# Patient Record
Sex: FEMALE | Race: WHITE | NOT HISPANIC OR LATINO | Employment: FULL TIME | URBAN - METROPOLITAN AREA
[De-identification: names, ages, dates, MRNs, and addresses within clinical notes are randomized per-mention and may not be internally consistent; named-entity substitution may affect disease eponyms.]

---

## 2018-04-02 ENCOUNTER — OFFICE VISIT CONVERTED (OUTPATIENT)
Dept: FAMILY MEDICINE CLINIC | Facility: CLINIC | Age: 28
End: 2018-04-02
Attending: NURSE PRACTITIONER

## 2018-09-21 ENCOUNTER — OFFICE VISIT CONVERTED (OUTPATIENT)
Dept: FAMILY MEDICINE CLINIC | Facility: CLINIC | Age: 28
End: 2018-09-21
Attending: NURSE PRACTITIONER

## 2018-09-28 ENCOUNTER — OFFICE VISIT CONVERTED (OUTPATIENT)
Dept: FAMILY MEDICINE CLINIC | Facility: CLINIC | Age: 28
End: 2018-09-28
Attending: NURSE PRACTITIONER

## 2018-09-28 ENCOUNTER — CONVERSION ENCOUNTER (OUTPATIENT)
Dept: FAMILY MEDICINE CLINIC | Facility: CLINIC | Age: 28
End: 2018-09-28

## 2018-10-09 ENCOUNTER — OFFICE VISIT CONVERTED (OUTPATIENT)
Dept: ONCOLOGY | Facility: HOSPITAL | Age: 28
End: 2018-10-09
Attending: INTERNAL MEDICINE

## 2018-10-19 ENCOUNTER — OFFICE VISIT CONVERTED (OUTPATIENT)
Dept: FAMILY MEDICINE CLINIC | Facility: CLINIC | Age: 28
End: 2018-10-19
Attending: NURSE PRACTITIONER

## 2018-10-23 ENCOUNTER — OFFICE VISIT CONVERTED (OUTPATIENT)
Dept: ONCOLOGY | Facility: HOSPITAL | Age: 28
End: 2018-10-23
Attending: INTERNAL MEDICINE

## 2018-12-18 ENCOUNTER — OFFICE VISIT CONVERTED (OUTPATIENT)
Dept: FAMILY MEDICINE CLINIC | Facility: CLINIC | Age: 28
End: 2018-12-18
Attending: NURSE PRACTITIONER

## 2018-12-18 ENCOUNTER — CONVERSION ENCOUNTER (OUTPATIENT)
Dept: FAMILY MEDICINE CLINIC | Facility: CLINIC | Age: 28
End: 2018-12-18

## 2019-01-07 ENCOUNTER — HOSPITAL ENCOUNTER (OUTPATIENT)
Dept: CT IMAGING | Facility: HOSPITAL | Age: 29
Discharge: HOME | End: 2019-01-07
Attending: INTERNAL MEDICINE

## 2019-01-24 ENCOUNTER — OFFICE VISIT CONVERTED (OUTPATIENT)
Dept: ONCOLOGY | Facility: HOSPITAL | Age: 29
End: 2019-01-24
Attending: INTERNAL MEDICINE

## 2019-02-15 ENCOUNTER — OFFICE VISIT CONVERTED (OUTPATIENT)
Dept: FAMILY MEDICINE CLINIC | Facility: CLINIC | Age: 29
End: 2019-02-15
Attending: NURSE PRACTITIONER

## 2019-03-14 ENCOUNTER — OFFICE VISIT CONVERTED (OUTPATIENT)
Dept: FAMILY MEDICINE CLINIC | Facility: CLINIC | Age: 29
End: 2019-03-14
Attending: NURSE PRACTITIONER

## 2019-04-18 ENCOUNTER — HOSPITAL ENCOUNTER (OUTPATIENT)
Dept: CARDIOLOGY | Facility: HOSPITAL | Age: 29
Discharge: HOME | End: 2019-04-18
Attending: NURSE PRACTITIONER

## 2019-04-18 ENCOUNTER — OFFICE VISIT CONVERTED (OUTPATIENT)
Dept: FAMILY MEDICINE CLINIC | Facility: CLINIC | Age: 29
End: 2019-04-18
Attending: NURSE PRACTITIONER

## 2019-04-18 ENCOUNTER — HOSPITAL ENCOUNTER (OUTPATIENT)
Dept: GENERAL RADIOLOGY | Facility: HOSPITAL | Age: 29
Discharge: HOME | End: 2019-04-18
Attending: NURSE PRACTITIONER

## 2019-04-24 ENCOUNTER — OFFICE VISIT CONVERTED (OUTPATIENT)
Dept: ONCOLOGY | Facility: HOSPITAL | Age: 29
End: 2019-04-24
Attending: INTERNAL MEDICINE

## 2019-04-24 ENCOUNTER — HOSPITAL ENCOUNTER (OUTPATIENT)
Dept: ONCOLOGY | Facility: HOSPITAL | Age: 29
Discharge: HOME | End: 2019-04-24
Attending: INTERNAL MEDICINE

## 2019-05-16 ENCOUNTER — HOSPITAL ENCOUNTER (OUTPATIENT)
Dept: GENERAL RADIOLOGY | Facility: HOSPITAL | Age: 29
Discharge: HOME | End: 2019-05-16
Attending: OBSTETRICS & GYNECOLOGY

## 2019-05-22 ENCOUNTER — HOSPITAL ENCOUNTER (OUTPATIENT)
Dept: FAMILY MEDICINE CLINIC | Facility: CLINIC | Age: 29
Discharge: HOME | End: 2019-05-22
Attending: NURSE PRACTITIONER

## 2019-05-22 ENCOUNTER — HOSPITAL ENCOUNTER (OUTPATIENT)
Dept: CT IMAGING | Facility: HOSPITAL | Age: 29
Discharge: HOME | End: 2019-05-22
Attending: NURSE PRACTITIONER

## 2019-05-22 ENCOUNTER — OFFICE VISIT CONVERTED (OUTPATIENT)
Dept: FAMILY MEDICINE CLINIC | Facility: CLINIC | Age: 29
End: 2019-05-22
Attending: NURSE PRACTITIONER

## 2019-05-22 ENCOUNTER — CONVERSION ENCOUNTER (OUTPATIENT)
Dept: FAMILY MEDICINE CLINIC | Facility: CLINIC | Age: 29
End: 2019-05-22

## 2019-05-24 LAB — BACTERIA UR CULT: NORMAL

## 2019-05-28 ENCOUNTER — HOSPITAL ENCOUNTER (OUTPATIENT)
Dept: OTHER | Facility: HOSPITAL | Age: 29
Discharge: HOME | End: 2019-05-28
Attending: OBSTETRICS & GYNECOLOGY

## 2019-05-29 ENCOUNTER — HOSPITAL ENCOUNTER (OUTPATIENT)
Dept: LAB | Facility: HOSPITAL | Age: 29
Discharge: HOME | End: 2019-05-29
Attending: OBSTETRICS & GYNECOLOGY

## 2019-05-29 LAB
ALBUMIN SERPL-MCNC: 4.6 G/DL (ref 3.5–5)
ALBUMIN/GLOB SERPL: 1.8 {RATIO} (ref 1.4–2.6)
ALP SERPL-CCNC: 62 U/L (ref 42–98)
ALT SERPL-CCNC: 11 U/L (ref 10–40)
ANION GAP SERPL CALC-SCNC: 15 MMOL/L (ref 8–19)
AST SERPL-CCNC: 14 U/L (ref 15–50)
BASOPHILS # BLD AUTO: 0.02 10*3/UL (ref 0–0.2)
BASOPHILS NFR BLD AUTO: 0.3 % (ref 0–3)
BILIRUB SERPL-MCNC: 0.36 MG/DL (ref 0.2–1.3)
BUN SERPL-MCNC: 13 MG/DL (ref 5–25)
BUN/CREAT SERPL: 17 {RATIO} (ref 6–20)
CALCIUM SERPL-MCNC: 9.5 MG/DL (ref 8.7–10.4)
CHLORIDE SERPL-SCNC: 102 MMOL/L (ref 99–111)
CONV ABS IMM GRAN: 0.02 10*3/UL (ref 0–0.2)
CONV CO2: 28 MMOL/L (ref 22–32)
CONV IMMATURE GRAN: 0.3 % (ref 0–1.8)
CONV TOTAL PROTEIN: 7.1 G/DL (ref 6.3–8.2)
CREAT UR-MCNC: 0.78 MG/DL (ref 0.5–0.9)
DEPRECATED RDW RBC AUTO: 41.7 FL (ref 36.4–46.3)
EOSINOPHIL # BLD AUTO: 0.07 10*3/UL (ref 0–0.7)
EOSINOPHIL # BLD AUTO: 1 % (ref 0–7)
ERYTHROCYTE [DISTWIDTH] IN BLOOD BY AUTOMATED COUNT: 12.1 % (ref 11.7–14.4)
FSH SERPL-ACNC: 8.7 M[IU]/ML
GFR SERPLBLD BASED ON 1.73 SQ M-ARVRAT: >60 ML/MIN/{1.73_M2}
GLOBULIN UR ELPH-MCNC: 2.5 G/DL (ref 2–3.5)
GLUCOSE SERPL-MCNC: 84 MG/DL (ref 65–99)
HBA1C MFR BLD: 14.5 G/DL (ref 12–16)
HCG INTACT+B SERPL-ACNC: <0.5 M[IU]/ML (ref 0–5)
HCT VFR BLD AUTO: 45.2 % (ref 37–47)
LH SERPL-ACNC: 18.5 M[IU]/ML
LYMPHOCYTES # BLD AUTO: 1.84 10*3/UL (ref 1–5)
MCH RBC QN AUTO: 30 PG (ref 27–31)
MCHC RBC AUTO-ENTMCNC: 32.1 G/DL (ref 33–37)
MCV RBC AUTO: 93.4 FL (ref 81–99)
MONOCYTES # BLD AUTO: 0.47 10*3/UL (ref 0.2–1.2)
MONOCYTES NFR BLD AUTO: 7 % (ref 3–10)
NEUTROPHILS # BLD AUTO: 4.26 10*3/UL (ref 2–8)
NEUTROPHILS NFR BLD AUTO: 63.9 % (ref 30–85)
NRBC CBCN: 0 % (ref 0–0.7)
OSMOLALITY SERPL CALC.SUM OF ELEC: 291 MOSM/KG (ref 273–304)
PLATELET # BLD AUTO: 315 10*3/UL (ref 130–400)
PMV BLD AUTO: 10.5 FL (ref 9.4–12.3)
POTASSIUM SERPL-SCNC: 4.1 MMOL/L (ref 3.5–5.3)
PROLACTIN SERPL-MCNC: 7.15 NG/ML
RBC # BLD AUTO: 4.84 10*6/UL (ref 4.2–5.4)
SODIUM SERPL-SCNC: 141 MMOL/L (ref 135–147)
TSH SERPL-ACNC: 0.94 M[IU]/L (ref 0.27–4.2)
VARIANT LYMPHS NFR BLD MANUAL: 27.5 % (ref 20–45)
WBC # BLD AUTO: 6.68 10*3/UL (ref 4.8–10.8)

## 2019-05-30 LAB — CONV THYROXINE TOTAL: 8.1 UG/DL (ref 4.5–12)

## 2019-08-29 ENCOUNTER — OFFICE VISIT CONVERTED (OUTPATIENT)
Dept: FAMILY MEDICINE CLINIC | Facility: CLINIC | Age: 29
End: 2019-08-29
Attending: NURSE PRACTITIONER

## 2019-08-29 ENCOUNTER — HOSPITAL ENCOUNTER (OUTPATIENT)
Dept: CT IMAGING | Facility: HOSPITAL | Age: 29
Discharge: HOME | End: 2019-08-29
Attending: NURSE PRACTITIONER

## 2019-12-13 ENCOUNTER — OFFICE VISIT CONVERTED (OUTPATIENT)
Dept: FAMILY MEDICINE CLINIC | Facility: CLINIC | Age: 29
End: 2019-12-13
Attending: NURSE PRACTITIONER

## 2020-02-03 ENCOUNTER — HOSPITAL ENCOUNTER (OUTPATIENT)
Dept: FAMILY MEDICINE CLINIC | Facility: CLINIC | Age: 30
Discharge: HOME | End: 2020-02-03
Attending: NURSE PRACTITIONER

## 2020-02-03 ENCOUNTER — OFFICE VISIT CONVERTED (OUTPATIENT)
Dept: FAMILY MEDICINE CLINIC | Facility: CLINIC | Age: 30
End: 2020-02-03
Attending: NURSE PRACTITIONER

## 2020-02-05 LAB
BACTERIA SPEC AEROBE CULT: NORMAL
BACTERIA UR CULT: NORMAL

## 2020-03-16 ENCOUNTER — OFFICE VISIT CONVERTED (OUTPATIENT)
Dept: FAMILY MEDICINE CLINIC | Facility: CLINIC | Age: 30
End: 2020-03-16
Attending: NURSE PRACTITIONER

## 2020-03-16 ENCOUNTER — HOSPITAL ENCOUNTER (OUTPATIENT)
Dept: GENERAL RADIOLOGY | Facility: HOSPITAL | Age: 30
Discharge: HOME | End: 2020-03-16
Attending: NURSE PRACTITIONER

## 2020-07-09 ENCOUNTER — HOSPITAL ENCOUNTER (OUTPATIENT)
Dept: OTHER | Facility: HOSPITAL | Age: 30
Discharge: HOME | End: 2020-07-09
Attending: INTERNAL MEDICINE

## 2020-07-09 LAB
T4 FREE SERPL-MCNC: 1.7 NG/DL (ref 0.9–1.8)
TSH SERPL-ACNC: 0.51 M[IU]/L (ref 0.27–4.2)

## 2020-07-10 LAB — T3FREE SERPL-MCNC: 3.6 PG/ML (ref 2–4.4)

## 2020-07-30 ENCOUNTER — HOSPITAL ENCOUNTER (OUTPATIENT)
Dept: LAB | Facility: HOSPITAL | Age: 30
Discharge: HOME | End: 2020-07-30
Attending: NURSE PRACTITIONER

## 2020-07-30 ENCOUNTER — OFFICE VISIT CONVERTED (OUTPATIENT)
Dept: FAMILY MEDICINE CLINIC | Facility: CLINIC | Age: 30
End: 2020-07-30
Attending: NURSE PRACTITIONER

## 2020-07-30 ENCOUNTER — CONVERSION ENCOUNTER (OUTPATIENT)
Dept: FAMILY MEDICINE CLINIC | Facility: CLINIC | Age: 30
End: 2020-07-30

## 2020-07-30 LAB
FOLATE SERPL-MCNC: 15.3 NG/ML (ref 4.8–20)
VIT B12 SERPL-MCNC: 301 PG/ML (ref 211–911)

## 2020-07-31 LAB
25(OH)D3 SERPL-MCNC: 37.5 NG/ML (ref 30–100)
IRON SATN MFR SERPL: 27 % (ref 20–55)
IRON SERPL-MCNC: 106 UG/DL (ref 60–170)
TIBC SERPL-MCNC: 396 UG/DL (ref 245–450)
TRANSFERRIN SERPL-MCNC: 277 MG/DL (ref 250–380)

## 2020-08-12 ENCOUNTER — CONVERSION ENCOUNTER (OUTPATIENT)
Dept: FAMILY MEDICINE CLINIC | Facility: CLINIC | Age: 30
End: 2020-08-12

## 2020-08-12 ENCOUNTER — OFFICE VISIT CONVERTED (OUTPATIENT)
Dept: FAMILY MEDICINE CLINIC | Facility: CLINIC | Age: 30
End: 2020-08-12
Attending: NURSE PRACTITIONER

## 2021-01-05 ENCOUNTER — HOSPITAL ENCOUNTER (OUTPATIENT)
Dept: GENERAL RADIOLOGY | Facility: HOSPITAL | Age: 31
Discharge: HOME | End: 2021-01-05
Attending: NURSE PRACTITIONER

## 2021-05-13 NOTE — PROGRESS NOTES
Progress Note      Patient Name: Kelin Streeter   Patient ID: [id number]   Sex: Female   YOB: 1990    Primary Care Provider: Odette NOWAK   Referring Provider: Odette NOWAK    Visit Date: August 12, 2020    Provider: SHE Vargas   Location: [facility]   Location Address: [address] [place] [address], [address]  REESE Lopez  [id number]   Location Phone: [phone]          Chief Complaint  · Annual Exam  · (Health Maintainence Information Reviewed Under Results)      History Of Present Illness  Last PAP Smear: 06/01/2020.   Date of Last Mammogram: NEVER   Date of Last Colonoscopy: NEVER   No current complaints.   Kelin Streeter is a 30 year old /White female who presents for evaluation and treatment of:      Patient is here today for a physical exam. Says she has no c/c.    anxiety-reports anxiety under better control w/prestiq.    hypothyroid-she has been on Synthroid x 2 yrs-denies any concerns w/thyroid.     hyperlipidemia-she ha been on crestor x 2 yrs -states she has not been tested in 2 yrs.    RAHAT-well controlled w/Zyrtec-admits she takes in the fall and spring.    last pap June -[facility].       Past Medical History  Disease Name Date Onset Notes   DVT (deep venous thrombosis) --  --    Hyperlipemia --  --    Hypothyroid --  --    Pap smear for cervical cancer screening 3/2019 --    Pulmonary emboli --  --          Past Surgical History  Procedure Name Date Notes   Cesarian Section 2012 --    Cholecystectomy 2007 --    Cyst Removal 2009 SINUS   IUD Removal 2012 --          Medication List  Name Date Started Instructions   Pristiq 25 mg oral tablet extended release 24 hr 07/30/2020 take 1 tablet by oral route daily for 90 days   rosuvastatin 5 mg oral tablet 12/02/2019 TAKE 1 TABLET BY MOUTH EVERYDAY AT BEDTIME   Synthroid 50 mcg oral tablet  take 1 tablet (50 mcg) by oral route once daily   Zyrtec 10 mg oral tablet  take 1 tablet (10 mg) by oral route once  daily         Allergy List  Allergen Name Date Reaction Notes   NO KNOWN DRUG ALLERGIES --  --  --        Allergies Reconciled  Family Medical History  Disease Name Relative/Age Notes   Breast Neoplasm, Malignant Grandmother (paternal)/   --    Lung Neoplasm, Malignant Grandmother (paternal)/   --    Cervical Neoplasm, Malignant Mother/   --    Skin Carcinoma In Situ Mother/   --    Hyperlipidemia Father/   --    Prostate cancer Uncle/   Paternal Uncle   -Mother's Family History Unknown  pt states mother was adopted         Reproductive History  Menstrual   Age Menarche: 11   Pregnancy Summary   Total Pregnancies: 2 Full Term: 1 Premature: 0   Ab Induced: 0 Ab Spontaneous: 0 Ectopics: 0   Multiples: 0 Livin         Social History  Finding Status Start/Stop Quantity Notes   Active but no formal exercise --  --/-- --  --    Alcohol Current some day --/-- occ 2020 -     --  --/-- --  --    No known infection risk --  --/-- --  --    Tobacco Never --/-- --  12/15/2017 - never          Immunizations  NameDate Admin Mfg Trade Name Lot Number Route Inj VIS Given VIS Publication   [id number]/2018 SKB Fluzone Quadrivalent  NE NE 2019    Comments:    Drvyimfni27/01/2015 NE Not Entered  NE NE 2016    Comments: states recieved in 2015 per patient.   Wcbychppo07/2014 NE Not Entered 2A2KX NE NE 2015   Comments: per pt   Wdjabfwat39/2011 SKB Fluzone Quadrivalent  NE NE 2019    Comments: outside facility   Tdap2011 SKB BOOSTRIX  NE NE 2019    Comments:          Review of Systems  · Constitutional  o Denies  o : fatigue, night sweats  · Eyes  o Denies  o : double vision, blurred vision  · HENT  o Denies  o : vertigo, recent head injury  · Breasts  o Denies  o : abnormal changes in breast size, additional breast symptoms except as noted in the HPI  · Cardiovascular  o Denies  o : chest pain, irregular heart beats  · Respiratory  o Denies  o : shortness of  "breath, productive cough  · Gastrointestinal  o Denies  o : nausea, vomiting  · Genitourinary  o Denies  o : dysuria, urinary retention  · Integument  o Denies  o : hair growth change, new skin lesions  · Neurologic  o Denies  o : altered mental status, seizures  · Musculoskeletal  o Denies  o : joint swelling, limitation of motion  · Endocrine  o Denies  o : cold intolerance, heat intolerance  · Psychiatric  o Admits  o : anxiety  o Denies  o : suicidal ideation, homicidal ideation  · Heme-Lymph  o Denies  o : petechiae, lymph node enlargement or tenderness  · Allergic-Immunologic  o Admits  o : seasonal allergies  o Denies  o : frequent illnesses      Vitals  Date Time BP Position Site L\R Cuff Size HR RR TEMP (F) WT  HT  BMI kg/m2 BSA m2 O2 Sat HC       08/12/2020 10:02 /60 Sitting    68 - R   139lbs 6oz 5'  3\" 24.69 1.68 99 %          Physical Examination  · Constitutional  o Appearance  o : well-nourished, in no acute distress  · Neck  o Inspection/Palpation  o : normal appearance, no masses or tenderness, trachea midline  o Thyroid  o : gland size normal, nontender, no nodules or masses present on palpation  · Respiratory  o Respiratory Effort  o : breathing unlabored  o Inspection of Chest  o : normal appearance  o Auscultation of Lungs  o : normal breath sounds throughout  · Cardiovascular  o Heart  o :   § Auscultation of Heart  § : regular rate and rhythm, no murmurs, gallops or rubs  · Neurologic  o Mental Status Examination  o :   § Orientation  § : grossly oriented to person, place and time  o Gait and Station  o : normal gait, able to stand without difficulty  · Psychiatric  o Judgement and Insight  o : judgment and insight intact  o Mood and Affect  o : mood normal, affect appropriate          Assessment  · Anxiety disorder     300.00/F41.9  anxiety under better control since starting Pristiq.  · Hyperlipidemia     272.4/E78.5  doing well on crestor-reports last lipid 2 yrs ago-ordered " today.  · Hypothyroidism     244.9/E03.9  doing well on current dose of Synthroid-she is seeing  (endocrinology) he is managing her thyroid.  · Routine lab draw     V72.60/Z01.89  · Seasonal allergies     477.9/J30.2  admits seasonal allergies takes Zyrtec prn symptoms.      Plan  · Orders  o CBC with Auto Diff [facility] ([id number]) - V72.60/Z01.89 - 08/12/2020  o CMP [facility] ([id number]) - V72.60/Z01.89 - 08/12/2020  o Lipid Panel [facility] ([id number]) - 272.4/E78.5 - 08/12/2020  o ACO-39: Current medications updated and reviewed () - - 08/12/2020  o ACO-14: Influenza immunization was not administered for reasons documented () - - 08/12/2020  · Medications  o Medications have been Reconciled  o Transition of Care or Provider Policy  · Instructions  o Advised that cheeses and other sources of dairy fats, animal fats, fast food, and the extras (candy, pastries, pies, doughnuts and cookies) all contain LDL raising nutrients. Advised to increase fruits, vegetables, whole grains, and to monitor portion sizes.   o Counseled on monthly breast self exams.   o Counseled on STD prevention.  o Counseled on diet and exercise.   o Counseled on weight-bearing exercise.  o Recommended Calcium with Vitamin D twice daily.  o Take all medications as prescribed/directed.  o Patient was educated/instructed on their diagnosis, treatment and medications prior to discharge from the clinic today.  o Patient instructed to seek medical attention urgently for new or worsening symptoms.  o Call the office with any concerns or questions.  o Discussed Covid-19 precautions including, but not limited to, social distancing, avoid touching your face, and hand washing.   o 3 month follow up  · Disposition  o Call or Return if symptoms worsen or persist.            Electronically Signed by: SHE Vargas -Author on August 12, 2020 10:27:00 AM

## 2021-05-15 VITALS
OXYGEN SATURATION: 100 % | DIASTOLIC BLOOD PRESSURE: 80 MMHG | HEIGHT: 63 IN | SYSTOLIC BLOOD PRESSURE: 120 MMHG | BODY MASS INDEX: 23.74 KG/M2 | WEIGHT: 134 LBS | HEART RATE: 76 BPM

## 2021-05-15 VITALS
OXYGEN SATURATION: 99 % | SYSTOLIC BLOOD PRESSURE: 118 MMHG | HEART RATE: 77 BPM | WEIGHT: 134.25 LBS | HEIGHT: 63 IN | DIASTOLIC BLOOD PRESSURE: 80 MMHG | BODY MASS INDEX: 23.79 KG/M2

## 2021-05-15 VITALS
DIASTOLIC BLOOD PRESSURE: 68 MMHG | BODY MASS INDEX: 24.98 KG/M2 | SYSTOLIC BLOOD PRESSURE: 122 MMHG | WEIGHT: 141 LBS | HEART RATE: 72 BPM | HEIGHT: 63 IN

## 2021-05-15 VITALS
BODY MASS INDEX: 24.7 KG/M2 | OXYGEN SATURATION: 99 % | HEIGHT: 63 IN | HEART RATE: 68 BPM | SYSTOLIC BLOOD PRESSURE: 112 MMHG | DIASTOLIC BLOOD PRESSURE: 60 MMHG | WEIGHT: 139.37 LBS

## 2021-05-15 VITALS
DIASTOLIC BLOOD PRESSURE: 80 MMHG | WEIGHT: 142.25 LBS | BODY MASS INDEX: 25.2 KG/M2 | HEART RATE: 67 BPM | SYSTOLIC BLOOD PRESSURE: 110 MMHG | HEIGHT: 63 IN | OXYGEN SATURATION: 99 % | TEMPERATURE: 97 F

## 2021-05-15 VITALS
HEART RATE: 114 BPM | OXYGEN SATURATION: 97 % | BODY MASS INDEX: 25.18 KG/M2 | DIASTOLIC BLOOD PRESSURE: 80 MMHG | TEMPERATURE: 98.7 F | HEIGHT: 63 IN | SYSTOLIC BLOOD PRESSURE: 110 MMHG | WEIGHT: 142.12 LBS

## 2021-05-15 VITALS
HEIGHT: 63 IN | DIASTOLIC BLOOD PRESSURE: 100 MMHG | WEIGHT: 125.37 LBS | TEMPERATURE: 98.9 F | OXYGEN SATURATION: 100 % | BODY MASS INDEX: 22.21 KG/M2 | SYSTOLIC BLOOD PRESSURE: 134 MMHG | HEART RATE: 104 BPM

## 2021-05-15 VITALS
BODY MASS INDEX: 24.5 KG/M2 | HEIGHT: 63 IN | HEART RATE: 88 BPM | DIASTOLIC BLOOD PRESSURE: 95 MMHG | WEIGHT: 138.25 LBS | SYSTOLIC BLOOD PRESSURE: 133 MMHG | OXYGEN SATURATION: 100 %

## 2021-05-15 VITALS
TEMPERATURE: 98.4 F | DIASTOLIC BLOOD PRESSURE: 96 MMHG | OXYGEN SATURATION: 100 % | HEIGHT: 67 IN | HEART RATE: 76 BPM | BODY MASS INDEX: 21.66 KG/M2 | SYSTOLIC BLOOD PRESSURE: 140 MMHG | WEIGHT: 138 LBS

## 2021-05-15 VITALS
HEIGHT: 63 IN | BODY MASS INDEX: 24.27 KG/M2 | DIASTOLIC BLOOD PRESSURE: 84 MMHG | SYSTOLIC BLOOD PRESSURE: 129 MMHG | WEIGHT: 137 LBS | OXYGEN SATURATION: 100 % | HEART RATE: 65 BPM

## 2021-05-15 VITALS
WEIGHT: 134.37 LBS | HEART RATE: 82 BPM | SYSTOLIC BLOOD PRESSURE: 134 MMHG | BODY MASS INDEX: 23.81 KG/M2 | HEIGHT: 63 IN | DIASTOLIC BLOOD PRESSURE: 85 MMHG

## 2021-05-16 VITALS
TEMPERATURE: 97.1 F | DIASTOLIC BLOOD PRESSURE: 86 MMHG | WEIGHT: 147.12 LBS | HEIGHT: 63 IN | SYSTOLIC BLOOD PRESSURE: 123 MMHG | HEART RATE: 103 BPM | BODY MASS INDEX: 26.07 KG/M2

## 2021-05-16 VITALS
HEART RATE: 88 BPM | DIASTOLIC BLOOD PRESSURE: 80 MMHG | HEIGHT: 63 IN | SYSTOLIC BLOOD PRESSURE: 128 MMHG | TEMPERATURE: 97 F | WEIGHT: 138 LBS | BODY MASS INDEX: 24.45 KG/M2

## 2021-05-16 VITALS
HEIGHT: 63 IN | WEIGHT: 136 LBS | SYSTOLIC BLOOD PRESSURE: 135 MMHG | BODY MASS INDEX: 24.1 KG/M2 | HEART RATE: 70 BPM | DIASTOLIC BLOOD PRESSURE: 84 MMHG

## 2021-05-16 VITALS
HEART RATE: 98 BPM | DIASTOLIC BLOOD PRESSURE: 80 MMHG | HEIGHT: 63 IN | SYSTOLIC BLOOD PRESSURE: 115 MMHG | WEIGHT: 138.25 LBS | BODY MASS INDEX: 24.5 KG/M2 | TEMPERATURE: 96.7 F

## 2021-05-28 VITALS
RESPIRATION RATE: 16 BRPM | WEIGHT: 135.8 LBS | OXYGEN SATURATION: 100 % | DIASTOLIC BLOOD PRESSURE: 97 MMHG | HEART RATE: 78 BPM | OXYGEN SATURATION: 100 % | BODY MASS INDEX: 24.06 KG/M2 | TEMPERATURE: 98.4 F | DIASTOLIC BLOOD PRESSURE: 81 MMHG | TEMPERATURE: 97.5 F | HEIGHT: 63 IN | HEIGHT: 63 IN | RESPIRATION RATE: 16 BRPM | HEART RATE: 60 BPM | SYSTOLIC BLOOD PRESSURE: 113 MMHG | SYSTOLIC BLOOD PRESSURE: 124 MMHG | BODY MASS INDEX: 24.02 KG/M2 | WEIGHT: 135.58 LBS

## 2021-05-28 VITALS
BODY MASS INDEX: 24.53 KG/M2 | OXYGEN SATURATION: 99 % | BODY MASS INDEX: 23.98 KG/M2 | HEART RATE: 80 BPM | OXYGEN SATURATION: 98 % | TEMPERATURE: 98.2 F | HEART RATE: 74 BPM | SYSTOLIC BLOOD PRESSURE: 131 MMHG | RESPIRATION RATE: 16 BRPM | DIASTOLIC BLOOD PRESSURE: 82 MMHG | WEIGHT: 135.31 LBS | HEIGHT: 63 IN | WEIGHT: 138.44 LBS | RESPIRATION RATE: 12 BRPM | HEIGHT: 63 IN | TEMPERATURE: 97.8 F | DIASTOLIC BLOOD PRESSURE: 96 MMHG | SYSTOLIC BLOOD PRESSURE: 116 MMHG

## 2021-05-28 NOTE — PROGRESS NOTES
Patient: JAIRO CORNEJO     Acct: [id number]     Report: #[id number]  UNIT #: [id number]     : 1990    Encounter Date:10/09/2018  PRIMARY CARE:   ***Signed***  --------------------------------------------------------------------------------------------------------------------  Chief Complaint      Encounter Date      Oct 9, 2018            Primary Care Provider      LIDIA RODRÍGUEZ            Referring Provider            [facility]            Patient Complaint      Patient is complaining of      Pt here for follow up from [facility], Bilateral PE            VITALS      Height 5 ft 3 in / 160.02 cm      Weight 135 lbs 5 oz / 61.362994 kg      BSA 1.64 m2      BMI 24.0 kg/m2      Temperature 97.8 F / 36.56 C - Oral      Pulse 80      Respirations 16      Blood Pressure 131/96 Sitting, Left Arm      Pulse Oximetry 98%, room air            HPI      The patient is a 28 year old white female who presents for follow up of [facility] for bilateral pulmonary embolism and acute deep venous     thrombosis.             The patient was admitted on 10/03/18 to the hospitalist service after presenting    with pleuritic chest pain on the left. She had CT scan pulmonary embolism     protocol which revealed bilateral pulmonary emboli. Her risk factors included     being on oral contraceptive pill and recent viral illness which had kept her     inactive and bedbound from [facility]. Work up in the hospital included bilateral     lower extremity duplex which revealed a right popliteal deep venous thrombosis.     Echocardiogram of the heart reveals normal right ventricle and normal left     ventricle with ejection fraction of 60%. There was trace mitral regurgitation.     Hypercoagulable panel was sent at the time of the acute clot which showed     borderline low protein S deficiency and triglycerides and cholesterol were     elevated. She is here today with her mother. She states there is no family     history of  inheritable clotting disorders. She has never had a deep venous     thrombosis or pulmonary embolism in the past. She had uncomplicated pregnancies     in regards to any blood clots but did suffer from preeclampsia and had 2 C-    sections. She has never had a spontaneous . Today she is complaining of     4-5/10 pleuritic chest pain located over the left chest that radiates around the    ribs into the back. She is also fatigued, intermittently coughing and short of     breath with exertion. She denies any hemoptysis, wheezing or shortness of breath    at rest. There is no swelling or redness to the lower extremities.              Review of Systems as noted.             PAST MEDICAL HISTORY:      1. Seasonal allergies.       2. Gestational hypertension.       3. Preeclampsia requiring .       4. Hypercholesterolemia.       5. Hypothyroidism.       6. Recent diagnosis of acute pulmonary embolism and deep venous thrombosis.             PAST SURGICAL HISTORY:      1.2 C-sections.       2. Cholecystectomy.       3. Nasal cyst removed.       4. Dental work.             FAMILY HISTORY:      Mother with a history of cervical cancer and melanoma. Her mother is adopted so     we do not know her side of the family's history.  Paternal grandmother with a     history of breast cancer. She has 2 brothers who are healthy. Mother and father     are both living.             SOCIAL HISTORY:      She is a lifetime nonsmoker. She works as a . She is      with 2 small children ages 1 and 6.             Medications were reviewed by myself with the patient and updated in the chart.      She is currently taking hydrocodone for her pleuritic chest pain alternated with    oxycodone. She is on Apixaban for deep venous thrombosis.            ROS      Constitutional:  Complains of: Fatigue; Denies: Fever, Weight gain, Weight loss,    Chills, Insomnia, Other      Respiratory/Breathing:  Complains of:  Shortness of air, Cough, Pleuritic pain;     Denies: Hemoptysis, Other      Endocrine:  Denies: Polydipsia, Polyuria, Heat/cold intolerance, Abnorml     menstrual pattern, Diabetes, Other      Eyes:  Denies: Blurred vision, Vision Changes, Other      Ears, nose, mouth, throat:  Denies: Mouth lesions, Thrush, Throat pain, Hoarsene    ss, Allergies/Hay Fever, Post Nasal Drip, Headaches, Recent Head Injury, Nose     Bleeding, Neck Stiffness, Thyroid Mass, Hearing Loss, Ear Fullness, Dry Mouth,     Nasal or Sinus Pain, Dry Lips, Nasal discharge, Nasal congestion, Other      Cardiovascular:  Denies: Palpitations, Syncope, Claudication, Chest Pain, Wake     up Gasping for air, Leg Swelling, Irregular Heart Rate, Cyanosis, Dyspnea on     Exertion, Other      Gastrointestinal:  Denies: Nausea, Constipation, Diarrhea, Abdominal pain,     Vomiting, Difficulty Swallowing, Reflux/Heartburn, Dysphagia, Jaundice,     Bloating, Melena, Bloody stools, Other      Genitourinary:  Denies: Urinary frequency, Incontinence, Hematuria, Urgency,     Nocturia, Dysuria, Testicular problems, Other      Musculoskeletal:  Denies: Joint Pain, Joint Stiffness, Joint Swelling, Myalgias,    Other      Hematologic/lymphatic:  DENIES: Lymphadenopathy, Bruising, Bleeding tendencies,     Other      Neurological:  Denies: Headache, Numbness, Weakness, Seizures, Other      Psychiatric:  Denies: Anxiety, Appropriate Effect, Depression, Other      Sleep:  No: Excessive daytime sleep, Morning Headache?, Snoring, Insomnia?, Stop    breathing at sleep?, Other      Integumentary:  Complains of: Rash (intermittent, coming and going over left     breast); Denies: Dry skin, Skin Warm to Touch, Other      Immunologic/Allergic:  Denies: Latex allergy, Seasonal allergies, Asthma,     Urticaria, Eczema, Other      Immunization status:  No: Up to date            FAMILY/SOCIAL/MEDICAL HX      Surgical History:  Yes: Abdominal Surgery (gall bladder removed), Head  Surgery     (NASAL CYST REMOVED), Oral Surgery ( DENTAL WORK-)      Stroke - Family Hx:  Uncle      Heart - Family Hx:  Grandparent      Cancer/Type - Family Hx:  Mother (cervical cancer,melanoma), Grandparent (both     side of family lung cancer, and one paternal grandmother breast CA)      Is Father Still Living?:  Yes      Is Mother Still Living?:  Yes      Social History:  No Tobacco Use, No Alcohol Use, No Recreational Drug use      Smoking status:  Never smoker      Occupation:  Teacher, middle school      Whom do you live with?:         Section:  Yes (secondary to pre-eclampsia)      Tubal Ligation:  No      Hysterectomy:  No      Anticoagulation Therapy:  Yes      Antibiotic Prophylaxis:  Yes      Medical History:  Yes: Allergies, High Blood Pressure (WITH PREGNANCY DURING     CSECTION), High Cholesterol (not on medication), Shortness Of Breath, Sinus     Trouble, Thyroid Problem (hypothyroidism), Miscellaneous Medical/oth (PE); No:     Arthritis, Asthma, Blood Disease, Chemotherapy/Cancer, Chronic Bronchitis/COPD,     Congestive Heart Failu, Deafness or Ringing Ears, Seizures, Heart Attack,     Hemorrhoids/Rectal Prob            PREVENTION      Hx Influenza Vaccination:  Yes      Date Influenza Vaccine Given:  Oct 4, 2018      2 or More Falls Past Year?:  No      Fall Past Year with Injury?:  No      Hx Pneumococcal Vaccination:  No      Encouraged to follow-up with:  PCP regarding preventative exams.      Chart initiated by      Gertrude Cisneros MA            ALLERGIES/MEDICATIONS      Allergies:        Coded Allergies:             NO KNOWN ALLERGIES (Unverified , 10/9/18)      Medications    Last Reconciled on 10/9/18 08:51 by ROSARIO WILLIS, DO      oxyCODONE HCl/Acetaminophen (oxyCODONE HCl/Acetaminophen 5/325 MG) 1 Each Tablet      1 TAB PO Q6H PRN for PAIN, TAB         Reported         10/9/18       Apixaban (Eliquis) 5 Mg Tablet      5 MG PO BID for 30 Days, #60 TAB 2 Refills          Prov: Adarsh Aburto         10/4/18       Hydrocodone/Acetaminophen 5/325 MG (Norco 5/325 Mg) 1 Tab Tab      1 TAB PO Q4H PRN for MODERATE PAIN (PAIN LEVEL 4-6) for 3 Days, #18 TAB 0 Refil    ls         Prov: Adarsh Aburto         10/4/18       Apixaban (Eliquis) 5 Mg Tablet      10 MG PO BID for 30 Days, #70 TAB 0 Refills         Prov: Adarsh Aburto         10/4/18       Ibuprofen (Ibuprofen) 200 Mg Tab      400 MG PO PRN for HEADACHE, #100 TAB 0 Refills         Reported         10/3/18       Acetaminophen* (Tylenol*) 325 Mg Tablet      650 MG PO ONCE PRN for MILD PAIN (PAIN LEVEL 1-3), TAB         Reported         10/3/18       Loratadine (Claritin) 10 Mg Tablet      10 MG PO QDAY, #30 TAB 0 Refills         Reported         10/3/18      Current Medications      Current Medications Reviewed 10/9/18            EXAM      Vital signs reviewed.      HEENT: Pupils are equally round and reactive to light and accommodation.      Extraocular muscles intact bilateral. Nares patent without hypertrophy of the     turbinates.  TM's are clear bilaterally. Small oropharynx without lesions or     erythema.       NECK:  Supple without tracheal deviation or lymphadenopathy. Thyromegaly was     appreciated without nodules.       LYMPHATICS: No cervical or supraclavicular lymphadenopathy.       RESPIRATORY:  Clear to auscultation bilaterally, no wheezes, rales or rhonchi,     tympanic to percussion.      CVS:  Regular rate and rhythm, no murmurs, rubs or gallops, no lower extremity     edema, , equal radial pulses.      GI: Abdomen soft, nontender, nondistended, no hepatomegaly appreciated.  Bowel     sounds present in all 4 quadrants.       MUSCULOSKELETAL: No erythema, warmth or fluctuance over the major joints     including the knees, ankles, wrists and elbows.        SKIN: No rashes or lesions.       NEUROLOGICAL: Alert and oriented X 3.  No focal deficits on exam. Cranial nerves    II-XII intact bilaterally.       PSYCH: Patient  has appropriate mood and affect.      Vtials      Vitals:             Height 5 ft 3 in / 160.02 cm           Weight 135 lbs 5 oz / 61.589008 kg           BSA 1.64 m2           BMI 24.0 kg/m2           Temperature 97.8 F / 36.56 C - Oral           Pulse 80           Respirations 16           Blood Pressure 131/96 Sitting, Left Arm           Pulse Oximetry 98%, room air            REVIEW      Results Reviewed      PCCS Results Reviewed?:  Yes Prev Lab Results, Yes Prev Radiology Results, Yes     Previous Mecial Records (I personally reviewed the patient's discharge summary     from the hospital.  )      Lab Results      I personally reviewed the patient's blood work in the hospital including her     lipid panel with a total cholesterol of 263, triglycerides 213, , HDL 53.    Anticardiolipin, IgG, IgA and IgM  were less than 9. Antithrombin III activity     was high at 43. Protein C was high at 181 and protein S was borderline low at     65, lower end of normal is 63.      Micro Results      I personally reviewed the patient's echocardiogram.      Radiographic Results      I personally reviewed the patient's CTA of the chest performed 10/03/18.  I also    reviewed lower extremity duplex. I reviewed a thyroid ultrasound performed in     2015.            Assessment      Pulmonary embolism - I26.99            Mediastinal lymphadenopathy - R59.0            DVT (deep venous thrombosis) - I82.409            Pulmonary embolism - I26.99            Thyromegaly - E01.0            Notes      New Medications      * oxyCODONE HCl/Acetaminophen (oxyCODONE HCl/Acetaminophen 5/325 MG) 1 EACH       TABLET: 1 TAB PO Q6H PRN PAIN      New Diagnostics      * Chest W/ Cont CT, 3 Months         Dx: Pulmonary embolism - I26.99      * Thyroid US, Week         Dx: Thyromegaly - E01.0      New Referrals      * Hematology, Month         [facility]         Dx: DVT (deep venous thrombosis) - I82.409      ASSESSMENT:      1.  Acute pulmonary embolism.      2. Right deep venous thrombosis.       3. Thyromegaly with a history of hypothyroidism.       4. Rash over left breast.       5. Hyperlipidemia untreated.       6. Hypertension.             PLAN:      1. Continue Eliquis. She is on 10 mg PO twice daily and will transition to 5 mg     PO twice daily after 1 month.       2. I will repeat a CT scan of the chest with contrast in 3 months time.       3. I have sent her to Dr. David or Dr. Zayas for evaluation of inheritable     clotting disorders. Unfortunately the tests were sent at the time of the acute     clot and this can present false positive protein S deficiency which I believe is    the case. I would recommend repeating these in the next few months.       4. She should follow up with her gynecologist to make sure all screening exams     including pap and breast exams are normal.       5. Follow up with primary care provider, would recommend treating lipids and     hypertension accordingly. She should also have the rash followed by her primary     care provider as well.       6. I have ordered a repeat ultrasound of her thyroid to rule out thyroid mass.       7.  I will see her back in 3 months with repeat imaging.            Patient Education      Time Spent:  > 50% /[facility]                 Disclaimer: Converted document may not contain table formatting or lab diagrams. Please see [employer] for the authenticated document.

## 2021-05-28 NOTE — PROGRESS NOTES
Patient: JAIRO CORNEJO     Acct: [id number]     Report: #[id number]  UNIT #: [id number]     : 1990    Encounter Date:2019  PRIMARY CARE:   ***Signed***  --------------------------------------------------------------------------------------------------------------------  Chief Complaint      Encounter Date      2019            Primary Care Provider      LIDIA RAO            Referring Provider            [facility]            Patient Complaint      Patient is complaining of      follow up/chest ct/hematology results/Pulmonary Embolism            VITALS      Height 5 ft 3 in / 160.02 cm      Weight 138 lbs 7 oz / 62.663155 kg      BSA 1.65 m2      BMI 24.5 kg/m2      Temperature 98.2 F / 36.78 C - Oral      Pulse 74      Respirations 12      Blood Pressure 116/82 Sitting, Left Arm      Pulse Oximetry 99%, room air            HPI      The patient is a 28 year old female who presents to follow up on bilateral     pulmonary emboli and right DVT.  She has been on Eliquis now for three months.     She has stepped down to 5 mg twice daily.  I have referred her to Dr. David for    an evaluation of a hypercoagulable workup and thus far it seems as if this has     been negative.  She does have a follow up appointment with him just to ensure     this is the fact in the interim.  She had some recurrent pain in her right lower    extremity. She was seen by her PCP, Dr. Rao and underwent a lower extremity     ultrasound of the right leg and this was negative for a DVT.  She has since     bought some compression stockings that are up to her thighs to wear since she is    on her feet a lot teaching. She denies any chest pain.  This has resolved after     about one month of being diagnosed with a PE.  She is no longer taking any     narcotics or inflammatories. She denies any shortness of breath, cough, wheezing    or chest pain.            Past medical, family, social and surgical history updated in the  chart.  She is     no longer using birth control pills. She is using condoms for birth control     measures.            Medications were reviewed and updated in the chart.            ROS      Constitutional:  Denies: Fatigue, Fever, Weight gain, Weight loss, Chills,     Insomnia, Other      Respiratory/Breathing:  Denies: Shortness of air, Wheezing, Cough, Hemoptysis,     Pleuritic pain, Other      Endocrine:  Denies: Polydipsia, Polyuria, Heat/cold intolerance, Abnorml menst    rual pattern, Diabetes, Other      Eyes:  Denies: Blurred vision, Vision Changes, Other      Ears, nose, mouth, throat:  Denies: Mouth lesions, Thrush, Throat pain,     Hoarseness, Allergies/Hay Fever, Post Nasal Drip, Headaches, Recent Head Injury,    Nose Bleeding, Neck Stiffness, Thyroid Mass, Hearing Loss, Ear Fullness, Dry     Mouth, Nasal or Sinus Pain, Dry Lips, Nasal discharge, Nasal congestion, Other      Cardiovascular:  Denies: Palpitations, Syncope, Claudication, Chest Pain, Wake     up Gasping for air, Leg Swelling, Irregular Heart Rate, Cyanosis, Dyspnea on     Exertion, Other      Gastrointestinal:  Denies: Nausea, Constipation, Diarrhea, Abdominal pain,     Vomiting, Difficulty Swallowing, Reflux/Heartburn, Dysphagia, Jaundice,     Bloating, Melena, Bloody stools, Other      Genitourinary:  Denies: Urinary frequency, Incontinence, Hematuria, Urgency,     Nocturia, Dysuria, Testicular problems, Other      Musculoskeletal:  Denies: Joint Pain, Joint Stiffness, Joint Swelling, Myalgias,    Other      Hematologic/lymphatic:  DENIES: Lymphadenopathy, Bruising, Bleeding tendencies,     Other      Psychiatric:  Denies: Anxiety, Appropriate Effect, Depression, Other      Sleep:  No: Excessive daytime sleep, Morning Headache?, Snoring, Insomnia?, Stop    breathing at sleep?, Other      Integumentary:  Denies: Rash, Dry skin, Skin Warm to Touch, Other      Immunologic/Allergic:  Denies: Latex allergy, Seasonal allergies, Asthma,      Urticaria, Eczema, Other      Immunization status:  No: Up to date            FAMILY/SOCIAL/MEDICAL HX      Surgical History:  Yes: Abdominal Surgery (gall bladder removed), Head Surgery     (NASAL CYST REMOVED), Oral Surgery ( DENTAL WORK-); No: AAA Repair,     Adenoids, Angioplasty, Appendectomy, Back Surgery, Bladder Surgery, Bowel     Surgery, Breast Surgery, CABG, Carotid Stenosis, Cholecystectomy, Ear Surgery,     Eye Surgery, Hernia Surgery, Kidney Surgery, Nose Surgery, Orthopedic Surgery,     Prostatectomy, Rectal Surgery, Spinal Surgery, Testicular Surgery, Throat     Surgery, Tonsils, Valve Replacement, Vascular Surgery, Other Surgeries      Stroke - Family Hx:  Uncle      Heart - Family Hx:  Grandparent      Cancer/Type - Family Hx:  Mother (cervical cancer,melanoma), Grandparent (both     side of family lung cancer, and one paternal grandmother breast CA)      Is Father Still Living?:  Yes      Is Mother Still Living?:  Yes       Family History:  Yes      Social History:  No Tobacco Use, No Alcohol Use, No Recreational Drug use      Smoking status:  Never smoker      Occupation:  Teacher, middle school      Whom do you live with?:         Section:  Yes (secondary to pre-eclampsia)      Tubal Ligation:  No      Hysterectomy:  No      Anticoagulation Therapy:  Yes      Antibiotic Prophylaxis:  Yes      Medical History:  Yes: Allergies, High Blood Pressure (WITH PREGNANCY DURING     CSECTION), High Cholesterol (not on medication), Shortness Of Breath, Sinus     Trouble, Thyroid Problem (hypothyroidism), Miscellaneous Medical/oth (PE); No:     Arthritis, Asthma, Blood Disease, Chemotherapy/Cancer, Chronic Bronchitis/COPD,     Congestive Heart Failu, Deafness or Ringing Ears, Seizures, Heart Attack,     Hemorrhoids/Rectal Prob, Night sweats      Psychiatric History      none            PREVENTION      Hx Influenza Vaccination:  Yes      Date Influenza Vaccine Given:  Oct 4, 2018      2  or More Falls Past Year?:  No      Fall Past Year with Injury?:  No      Hx Pneumococcal Vaccination:  No      Encouraged to follow-up with:  PCP regarding preventative exams.      Chart initiated by      brian morales ma            ALLERGIES/MEDICATIONS      Allergies:        Coded Allergies:             NO KNOWN ALLERGIES (Unverified , 1/24/19)      Medications    Last Reconciled on 1/24/19 16:09 by ROSARIO WILLIS DO      Rosuvastatin Calcium (Crestor*) 5 Mg Tab      5 MG PO HS, #30 TAB 0 Refills         Reported         1/24/19       Levothyroxine (Synthroid) 0.05 Mg      0.05 MG PO QDAY, #30 TAB 0 Refills         Reported         1/24/19       Apixaban (Eliquis) 5 Mg Tablet      5 MG PO BID for 30 Days, #60 TAB 2 Refills         Prov: Adarsh Aburto         10/4/18       Loratadine (Claritin) 10 Mg Tablet      10 MG PO QDAY, #30 TAB 0 Refills         Reported         10/3/18      Current Medications      Current Medications Reviewed 1/24/19            EXAM      VITAL SIGNS:  Reviewed.        NECK:  Supple without tracheal deviation or lymphadenopathy.  No thyromegaly     appreciated.      LYMPHATICS:  No cervical or supraclavicular lymphadenopathy.      HEENT: Pupils are equal, round and reactive to light. There is no scleral     icterus.  Nares patent without hypertrophy of the turbinates. No erythema of the    passages.  The posterior pharynx is without  lesions or erythema.      RESPIRATORY:  Clear to auscultation bilaterally.  No wheezes, rales or rhonchi.     Tympanic to percussion.        CARDIOVASCULAR:  Regular rate and rhythm.  No murmurs, gallops or rubs.  No     lower extremity edema.  Equal radial pulses.        GI: Soft, nontender, nondistended, no organomegaly.  Bowel sounds present in all    four quadrants.      MUSCULOSKELETAL:  No joint effusions, erythema or warmth over the major joint     systems.      SKIN:  No rashes or lesions.      NEUROLOGIC: Cranial nerves II-XII are intact bilaterally.   Moves all     extremities. Ambulates with ease.      PSYCH:  Appropriate mood and affect.      Vtials      Vitals:             Height 5 ft 3 in / 160.02 cm           Weight 138 lbs 7 oz / 62.027130 kg           BSA 1.65 m2           BMI 24.5 kg/m2           Temperature 98.2 F / 36.78 C - Oral           Pulse 74           Respirations 12           Blood Pressure 116/82 Sitting, Left Arm           Pulse Oximetry 99%, room air            REVIEW      Results Reviewed      PCCS Results Reviewed?:  Yes Prev Lab Results, Yes Prev Radiology Results, Yes     Previous Mecial Records      Radiographic Results      I reviewed an ultrasound of her thyroid that was ordered to workup thyromegaly     and this was negative.  I reviewed repeat CT scan of the chest with contrast     performed 2019.                       [facility]                PACS RADIOLOGY REPORT            Patient: JAIRO CORNEJO   Acct: #[id number]   Report: #[id number]            UNIT #: [id number]    DOS: 19 1306      INSURANCE:[employer] - [employer]   ORDER #:[id number]      LOCATION:CT     : 1990            PROVIDERS      ADMITTING:     ATTENDING: ROSARIO WILLIS      FAMILY:  LIDIA RODRÍGUEZ   ORDERING:  ROSARIO WILLIS         OTHER: LUCY COFFEY   DICTATING:  Sachin Moreno MD            REQ #:[id number]   EXAM:CHW - CT CHEST with CONTRAST      REASON FOR EXAM:        REASON FOR VISIT:  PULM EMB            *******Signed******         PROCEDURE:   CT CHEST W/ CONTRAST             COMPARISON:   [facility], CT, CHEST W/ CONTRAST, 10/03/2018,     11:58.             INDICATIONS:   HX OF PE OCT 2018, AND ENLARGED LYMPH NODES.  FOLLOWUP.             TECHNIQUE:   After obtaining the patient's consent, CT images were obtained with    non-ionic       intravenous contrast material.               PROTOCOL:     Standard imaging protocol performed                RADIATION:     DLP:  427mGy*cm          Automated exposure control was utilized to minimize radiation dose.       CONTRAST:   100cc Isovue 370 I.V.             FINDINGS:      No significant focal filling defects are observed to indicate the presence of     significant pulmonary       embolism at this time.  There are subtle changes at the bifurcations of the     segmental to       subsegmental pulmonary arteries bilaterally at the sites of prior pulmonary em    bolism which may be       related to subtle residual changes.  There is no evidence for large or central     pulmonary embolism.             Mild breathing motion artifact is seen bilaterally. Scattered atelectasis is     noted.  Focal residual       atelectasis/scarring is observed in the lingula.  No well-defined consolidations    or significant       pleural effusions are observed. No dominant pulmonary nodules or abnormal     pulmonary masses are       seen.              No significant hilar, mediastinal, or axillary lymphadenopathy is seen.  There     is no evidence for       significant lymphadenopathy at this time.  A normal aortic arch branching     pattern is noted. The       thyroid gland is unremarkable. The esophagus is unremarkable.  There may be     residual thymic tissue       within the anterior superior mediastinum.             The limited evaluation of the upper abdomen demonstrates no definitive acute     abnormalities.             No acute osseous abnormalities are seen.                CONCLUSION:         1. Near complete resolution of the pulmonary emboli seen on the prior study.      There may be very       subtle residual changes at the bifurcations of the segmental and subsegmental     pulmonary arteries       bilaterally related to residual changes from the prior emboli.  There is no     large or central       pulmonary embolism.      2. No evidence for acute intrathoracic abnormality.      3. No evidence for significant lymphadenopathy.               JOANNE GASCA MD             Electronically Signed and Approved By: JOANNE GASCA MD on 2019 at 14:49                           Until signed, this is an unconfirmed preliminary report that may contain      errors and is subject to change.                                              CLEMENTDA:      D:19 1449            Assessment      Notes      New Medications      * Levothyroxine (Synthroid) 0.05 M.05 MG PO QDAY #30         Instructions: Take on an empty stomach.      * Rosuvastatin Calcium (Crestor*) 5 MG TAB: 5 MG PO HS #30      Discontinued Medications      * Hydrocodone/Acetaminophen 5/325 MG (Norco 5/325 Mg) 1 TAB TAB: 1 TAB PO Q4H       PRN MODERATE PAIN (PAIN LEVEL 4-6) 3 Days #18      ASSESSMENT:      1. Bilateral pulmonary emboli.      2. Right DVT.      3.  History of birth control use.      4. History of hypothyroidism.      5. Rash over left breast, resolved.      6. Pleuritic chest pain, resolved.      7. Hypertension per PCP.            PLAN:      1. Continue Eliquis, she will need a total of six months treatment.      2.  I reviewed the repeat CT scan of the chest. There are only small residual     pulmonary emboli at the bifurcation of the pulmonary arteries at the     subsegmental branches.  These should resolve completely with another three     months of Eliquis. Once she has completed six months of anticoagulation, she is     good to stop this medication.  She should not go back on birth control pills as     this is probably what prompted her pulmonary emboli.        3. Follow up with me on an as needed basis.            Patient Education      Other Education:  pulmonary emboli                 Disclaimer: Converted document may not contain table formatting or lab diagrams. Please see [employer] for the authenticated document.

## 2021-05-28 NOTE — PROGRESS NOTES
Patient: JAIRO CORNEJO     Acct: [id number]     Report: #[id number]  UNIT #: [id number]     : 1990    Encounter Date:2019  PRIMARY CARE:   ***Signed***  --------------------------------------------------------------------------------------------------------------------  NURSE INTAKE      Visit Type      Established Patient Visit            Chief Complaint      P.E            Referring Provider/Copies To      Referring Provider:  ROSARIO WILLIS      Primary Care Provider:  LIDIA RODRÍGUEZ            History and Present Illness      Past Oncology Illness History      Mrs. Cornejo is a 29yo WF recently found to have bilateral PE's.  She presented     to [facility] ED on 10/3/18 after having a restless night followed by chest discomfort     that am.  Scans found bilateral PE's and ultimately she was found to have a     right popliteal DVT.  She has a hx of BCP use, not a smoker and questionable     family hx of clotting disorder. She denies recent long car drive or plane ride.     She is currently on Eliquis 5mg BID and tolerating w/o issue.  She denies     bleeding or excess bruising at this time.  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  She is no longer utilizing BCP.  She denies SOA or CP;     however, if she hollers or talks loud she can feel it in her chest.            HPI - Oncology Interim      F/u pulmonary emboli and DVT--has been on Eliquis going on 6mo--no issues with     med noted-no bleeding or excessive bruising.  She did have some chest discomfort    the other evening and scarred her.  RLE continues to have discomfort as well-has    LAYLA hose however way too small--suggested support hose.  BLE are dusky in color;    venous insufficiency noted on scans.  She denies chest pain, SOA or distress at     this time.  RLE w/o edema at this time.            Clinical Trial Participant      No            ECOG Performance Status      0            Most Recent Imaging Findings      19       PROCEDURE:   CT CHEST W/ CONTRAST             COMPARISON:   [facility], CT, CHEST W/ CONTRAST, 10/03/2018,     11:58.  [facility], CT, CHEST W/ CONTRAST, 1/07/2019, 13:57.             INDICATIONS:   H/O PE OCT 2018. HANANE CHEST PAIN. PT DENIES PREGNANCY.             PROTOCOL:     PE imaging protocol performed                RADIATION:     DLP: 281.3mGy*cm          Automated exposure control was utilized to minimize radiation dose.       CONTRAST:   100cc Isovue 370 I.V.             TECHNIQUE:   Axial images of the chest with intravenous contrast.             FINDINGS:      No pulmonary emboli are identified.  The thoracic aorta has a normal caliber.      No evidence of       suspicious pulmonary nodule or acute parenchymal consolidation.  Stable mild     scarring in the       lingula.  There are no enlarged mediastinal, axillary or hilar lymph nodes.  No     evidence of       pneumothorax or pleural effusion.  The patient is status post cholecystectomy.      No acute osseous       abnormalities are identified.             IMPRESSION:  Normal exam.            PROCEDURE:   VENOUS EVAL LOWER DUPLEX             COMPARISON:   None.             INDICATIONS:   BLE PAIN             TECHNIQUE:   Color duplex Doppler ultrasound evaluation and analysis of the deep    venous system of       both lower extremities was performed in the usual manner.               Right Impression:       There is normal spontaneous flow, respiratory variation, and augmentation     compression responses at       the common femoral, superficial femoral, popliteal and tibial venous positions.     Venous duplex       imaging compression analysis shows normal compressible greater saphenous, common    femoral,       superficial femoral, popliteal, and tibial veins.  No intraluminal thrombus is     evident.  Tibial       insufficiency is noted.             Left Impression:       There is normal spontaneous flow,  respiratory variation, and augmentation     compression responses at       the common femoral, superficial femoral, popliteal and tibial venous positions.     Venous duplex       imaging compression analysis shows normal compressible greater saphenous, common    femoral,       superficial femoral, popliteal, and tibial veins.  No intraluminal thrombus is     evident.  Popliteal       and tibial insufficiency is noted.             CONCLUSION:         1. No evidence for an acute deep venous thrombosis in the lower extremities.      2. Bilateral lower extremity deep venous insufficiency.            PAST, FAMILY   Past Medical History      Other PMH      P.E      Other Hematology/Oncology      P.E            Past Surgical History      Cholecystectomy      C SECTION X 2, LAPROSCOPY, SINUSIS REMOVED.            Family History      Family History:  Cervical Cancer (MOTHER), Lung Cancer (PAT GRANDMOTHER), Skin     Cancer (MOTHER)            Social History      Marital Status:        Lives independently:  Yes      Number of Children:  2      Occupation:  TEACHER            Tobacco Use      Tobacco status:  Never smoker            Alcohol Use      Alcohol intake:  OCCASIONALLY            Substance Use      Substance use:  Denies use            REVIEW OF SYSTEMS      General:  Admits: Fatigue      ENT:  Denies: Seizures      Cardiovascular:  Admits: Chest Pain;          Denies: Edema Ankles, Edema Legs, Irregular Heartbeat, Palpitations      Respiratory:  Denies: Coughing Blood, Productive Cough, Shortness of Air,     Wheezing      Gastrointestinal:  Denies: Bloody Stools, Constipation, Diarrhea, Frequent     Heartburn, Nausea, Problem Swallowing, Tarry Stools, Unable to Control Bowels,     Vomiting      Genitourinary (female):  Denies: Blood in Urine, Decrease Urine Stream, Frequent    Urination, Incontinence, Painful Urination      Musculoskeletal:  Denies: Back Pain, Leg Cramps, Muscle Pain, Muscle Weakness,      Painful Joints, Swollen Joints      Integumentary:  Denies: Mole Changes      Neurologic:  Denies: Numbness\Tingling      Endocrine:  Admits: Hypothyroidism            VITAL SIGNS AND SCORES      Vitals      Height 5 ft 3.00 in / 160.02 cm      Weight 135 lbs 9.326 oz / 61.5 kg      BSA 1.64 m2      BMI 24.0 kg/m2      Temperature 98.4 F / 36.89 C - Temporal      Pulse 78      Respirations 16      Blood Pressure 124/97 Sitting, Left Arm      Pulse Oximetry 100%, RM AIR            Pain Score      Pain Scale Used:  Numerical      Pain Intensity:  2            Fatigue Score      Fatigue (0-10 scale):  2            EXAM      General Appearance:  Positive for: Alert, Oriented x3, Cooperative;          Negative for: Acute Distress      Respiratory:  Positive for: CTAB, Normal Respiratory Effort      Abdomen/Gastro:  Positive for: Normal Active Bowel Sounds, Soft;          Negative for: Distention, Hepatosplenomegaly, Tenderness      Cardiovascular:  Positive for: RRR;          Negative for: Gallop, Murmur, Peripheral Edema, Rub      Psychiatric:  Positive for: Appropriate Affect, Intact Judgement            PREVENTION      Hx Influenza Vaccination:  Yes      Date Influenza Vaccine Given:  Oct 4, 2018      2 or More Falls Past Year?:  No      Fall Past Year with Injury?:  No      Encouraged to follow-up with:  PCP regarding preventative exams.      Chart initiated by      PRINCE BARRY MA            ALLERGY/MEDS      Allergies      Coded Allergies:             NO KNOWN ALLERGIES (Unverified , 4/24/19)            Medications      Last Reconciled on 4/24/19 15:47 by SHE JARAMILLO      Apixaban (Eliquis) 5 Mg Tablet      5 MG PO BID for 30 Days, #60 TAB 2 Refills         Prov: ROSARIO WILLIS         2/4/19       Rosuvastatin Calcium (Crestor*) 5 Mg Tab      5 MG PO HS, #30 TAB 0 Refills         Reported         1/24/19       Levothyroxine (Synthroid) 0.05 Mg      0.05 MG PO QDAY, #30 TAB 0 Refills         Reported          1/24/19       Loratadine (Claritin) 10 Mg Tablet      10 MG PO QDAY, #30 TAB 0 Refills         Reported         10/3/18      Medications Reviewed:  No Changes made to meds            IMPRESSION/PLAN      Impression      Pulmonary embolism.            Diagnosis      Pulmonary embolism         Acute pulmonary embolism without acute cor pulmonale, unspecified pulmonary        embolism type - I26.99         Pulmonary embolism type: unspecified         Chronicity: acute         Acute cor pulmonale presence: without acute cor pulmonale      Patient has been on anticoagulation for approaching 6 months.  Hypercoagulable     work-up is negative.  Likely related to her birth control pill.  Complete 6     months of therapy and then she can stop her anticoagulation.  We discussed     support hose.  RTC as needed            Patient Education      Patient Education Provided:  Yes            Topics Patient Counseled on      Discontinuation of Eliquis and f/u hematology as needed                 Disclaimer: Converted document may not contain table formatting or lab diagrams. Please see [employer] for the authenticated document.

## 2021-05-28 NOTE — PROGRESS NOTES
Patient: JAIRO CORNEJO     Acct: [id number]     Report: #[id number]  UNIT #: [id number]     : 1990    Encounter Date:10/23/2018  PRIMARY CARE:   ***Signed***  --------------------------------------------------------------------------------------------------------------------  Visit Type      New Patient Visit            Chief Complaint      pulmonary embolism            Referring Provider/Copies To      Referring Provider:  ROSARIO WILLIS            Allergies      Coded Allergies:             NO KNOWN ALLERGIES (Unverified , 10/23/18)            Medications      Last Reconciled on 10/23/18 08:40 by SHE JARAMILLO      oxyCODONE HCl/Acetaminophen (oxyCODONE HCl/Acetaminophen 5/325 MG) 1 Each Tablet      1 TAB PO Q6H PRN for PAIN, TAB         Reported         10/9/18       Apixaban (Eliquis) 5 Mg Tablet      5 MG PO BID for 30 Days, #60 TAB 2 Refills         Prov: Adarsh Aburto         10/4/18       Hydrocodone/Acetaminophen 5/325 MG (Norco 5/325 Mg) 1 Tab Tab      1 TAB PO Q4H PRN for MODERATE PAIN (PAIN LEVEL 4-6) for 3 Days, #18 TAB 0     Refills         Prov: Adarsh Aburto         10/4/18       Apixaban (Eliquis) 5 Mg Tablet      10 MG PO BID for 30 Days, #70 TAB 0 Refills         Prov: Adarsh Aburto         10/4/18       Ibuprofen (Ibuprofen) 200 Mg Tab      400 MG PO PRN for HEADACHE, #100 TAB 0 Refills         Reported         10/3/18       Acetaminophen (Tylenol) 325 Mg Tablet      650 MG PO ONCE PRN for MILD PAIN (PAIN LEVEL 1-3), TAB         Reported         10/3/18       Loratadine (Claritin) 10 Mg Tablet      10 MG PO QDAY, #30 TAB 0 Refills         Reported         10/3/18      Medications Reviewed:  No Changes made to meds            History and Present Illness      Past Oncology Illness History      Mrs. Cornejo is a 29yo WF recently found to have bilateral PE's.  She presented     to [facility] ED on 10/3/18 after having a restless night followed by chest discomfort     that am.  Scans  found bilateral PE's and ultimately she was found to have a     right popliteal DVT.  She has a hx of BCP use, not a smoker and questionable     family hx of clotting disorder. She denies recent long car drive or plane ride.     She is currently on Eliquis 5mg BID and tolerating w/o issue.  She denies     bleeding or excess bruising at this time.  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  She is no longer utilizing BCP.  She denies SOA or CP;     however, if she hollers or talks loud she can feel it in her chest.            HPI - Hematology      Mrs. Streeter is a 27yo WF recently found to have bilateral PE's.  She presented     to [facility] ED on 10/3/18 after having a restless night followed by chest discomfort     that am. It started abruptly.  Scans found bilateral PE's and ultimately she was    found to have a right popliteal DVT.  She has a hx of BCP use, not a smoker and     questionable family hx of clotting disorder (father with testicular blood clot).     She denies recent long car drive or plane ride. She also had a recent diagnosis    of mono and had been less active.  She is currently on Eliquis 5mg BID and     tolerating w/o issue.  She denies bleeding or excess bruising at this time.  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  She is no longer utilizing     BCP.  She denies SOA or CP.            Clinical Trial Participant      No            ECOG Performance Status      0            PAST, FAMILY   Past Medical History      Past Medical History:  Thyroid Disease            Past Surgical History      REPORTS HX OF: Cholecystectomy, Other Past Surgical Hx (C SECTION)            Family History      REPORTS HX OF: Breast cancer (GRANDMOTHER PATERNAL), Lung cancer (GREAT     GRANDFATHER), Skin Cancer (MOTHER), Other Cancer History (PATERNAL GREAT     GRANDFATHER THROAT CA/CERVICAL CA MOTHER)            Social History      Lives independently:  Yes      Occupation:  TEACHER             Tobacco Use      Tobacco status:  Never smoker            Alcohol Use      Alcohol intake:  OCCASIONALLY            Substance Use      Substance use:  Denies use            REVIEW OF SYSTEMS      General:  Complains of: Fatigue; Denies: Appetite change, Excessive sweating,     Fever, Night sweats, Weight gain, Weight loss, Other      Eyes:  Denies: Blurred vision, Corrective lenses, Diplopia, Eye irritation, Eye     pain, Eye redness, Spots in vision, Vision loss, Other      Ears, nose, mouth, throat:  Denies: Headache, Seizures, Visual Changes, Hearing     loss, Sinus Congestion, Hoarseness, Sore throat, Other      Cardiovascular:  Complains of: Chest pain; Denies: Irregular heartbeat,     Palpitations, Swollen ankles/legs, Other      Respiratory:  Complains of: Chest pain, Shortness of Air; Denies: Productive     cough, Coughing blood, Other      Gastrointestinal:  Denies: Nausea, Vomiting, Problem swallowing, Frequent     heartburn, Constipation, Diarrhea, Tarry stools, Bloody stools, Unable to     control bowels, Other      Kidney/Bladder:  Denies: Painful Urination, Change in urinary stream, Blood in     urine, Incontinence, Frequent Urination, Decreased urine stream, Other      Musculoskeletal:  Denies: New Back pain, Leg Cramps, Painful Joints, Swollen     Joints, Muscle Pain, Muscle weakness, Other      Skin:  COMPLAINS OF: Easy Bleeding; DENIES: Jaundice, Lesions/changes in moles,     Nail changes, Skin Discoloration, Rash, Other      Neurological:  Denies: Dizziness, Fainting, Numbness\Tingling, Paralysis,     Seizures, Other      Psychiatric:  Complains of: AAO X 3; Denies: Anxiety, Panic attacks, Depression,    Memory loss, Other      Endocrine:  Denies Thyroid DisorderDiabetesOsteoporosisEndocrine Other      Hematologic/lymphatic:  Complains of: Bruising, Bleeding, Enlarged Lymph Nodes;     Denies: Recurrent infections, Other      Reproductive:  Complains of Still Menstruating; Denies Pregnant, Denies      Menopause, Denies Heavy Periods, Denies Other            VITAL SIGNS,PAIN/FATIGUE SCORE      Vitals      Height 5 ft 3.19 in / 160.5 cm      Weight 135 lbs 12.854 oz / 61.6 kg      BSA 1.64 m2      BMI 23.9 kg/m2      Temperature 97.5 F / 36.39 C - Temporal      Pulse 60      Respirations 16      Blood Pressure 113/81 Sitting, Left Arm      Pulse Oximetry 100%, RM AIR            Pain Score      Experiencing any pain?:  No            Fatigue Score      Experiencing any fatigue?:  Yes      Fatigue (0-10 scale):  2            EXAM      General Appearance:  Alert, Oriented X3, Cooperative, No acute distress      Eyes:  Anicteric Sclerae, Moist Conjunctiva      Neck:  Supple, No Masses or JVD      Respiratory:  CTAB, Normal Respiratory Effort      Abdomen:  Bowel Sounds, Soft;          No Distention, No Hepatosplenomegaly, No Tenderness      Cardio:  RRR, No Murmur, No, Peripheral Edema      Psychiatric:  Appropriate Affect, Intact Judgement      Extremities:  No Digital Cyanosis (upper), No Digital Ischemia (upper)      Lymphatic:  No Axillary, No Cervical, No Infraclavicular, No Supraclavicular            PREVENTION      Hx Influenza Vaccination:  Yes      Date Influenza Vaccine Given:  Oct 4, 2018      2 or More Falls Past Year?:  No      Fall Past Year with Injury?:  No      Hx Pneumococcal Vaccination:  No      Encouraged to follow-up with:  PCP regarding preventative exams.      Chart initiated by      PRINCE BARRY MA            IMPRESSION/PLAN      Impression      provoked DVT/PE related to birth control pill            Diagnosis      Pulmonary embolism         Other acute pulmonary embolism without acute cor pulmonale - I26.99         Pulmonary embolism type: other         Chronicity: acute         Acute cor pulmonale presence: without acute cor pulmonale      Pt's hypercoaguable workup so far is neg. Will add factor II gene mutation. She     is on appropriate anticoagulation with Eliquis. Given the  bilateral nature of     her clot, I would recommend 6 mo of therapy (as long as she is tolerating     without bleeding issues). Avoid hormone based birth control. Exercise regularly.    RTC 6 mo      New Diagnostics      * FACTOR 2 PROTHROMB MUTAT F2DNA, Routine            Notes      Discontinued Medications      * Apixaban (Eliquis) 5 MG TABLET: 10 MG PO BID 30 Days #70         Instructions: 2 tabs (10mg) BID for 7 days, then 1 tab (5mg)BID for 21 days,        to complete the first 4 weeks.  Later months: 1 tab BID, disp qty #60 (see        other Rx)            Patient Education      Patient Education Provided:  Yes                 Disclaimer: Converted document may not contain table formatting or lab diagrams. Please see [employer] for the authenticated document.

## 2021-10-11 PROCEDURE — 87077 CULTURE AEROBIC IDENTIFY: CPT | Performed by: FAMILY MEDICINE

## 2021-10-11 PROCEDURE — 87086 URINE CULTURE/COLONY COUNT: CPT | Performed by: FAMILY MEDICINE

## 2021-10-11 PROCEDURE — 87186 SC STD MICRODIL/AGAR DIL: CPT | Performed by: FAMILY MEDICINE

## 2021-10-12 ENCOUNTER — APPOINTMENT (OUTPATIENT)
Dept: CT IMAGING | Facility: HOSPITAL | Age: 31
End: 2021-10-12

## 2021-10-12 ENCOUNTER — HOSPITAL ENCOUNTER (EMERGENCY)
Facility: HOSPITAL | Age: 31
Discharge: HOME | End: 2021-10-12
Attending: EMERGENCY MEDICINE | Admitting: EMERGENCY MEDICINE

## 2021-10-12 VITALS
HEIGHT: 63 IN | DIASTOLIC BLOOD PRESSURE: 89 MMHG | SYSTOLIC BLOOD PRESSURE: 125 MMHG | OXYGEN SATURATION: 100 % | RESPIRATION RATE: 20 BRPM | BODY MASS INDEX: 26.02 KG/M2 | WEIGHT: 146.83 LBS | TEMPERATURE: 98.5 F | HEART RATE: 78 BPM

## 2021-10-12 DIAGNOSIS — N39.0 ACUTE LOWER UTI: Primary | ICD-10-CM

## 2021-10-12 DIAGNOSIS — R10.84 GENERALIZED ABDOMINAL PAIN: ICD-10-CM

## 2021-10-12 LAB
ALBUMIN SERPL-MCNC: 4.6 G/DL (ref 3.5–5.2)
ALBUMIN/GLOB SERPL: 1.9 G/DL
ALP SERPL-CCNC: 61 U/L (ref 39–117)
ALT SERPL W P-5'-P-CCNC: 9 U/L (ref 1–33)
ANION GAP SERPL CALCULATED.3IONS-SCNC: 9.5 MMOL/L (ref 5–15)
AST SERPL-CCNC: 13 U/L (ref 1–32)
BACTERIA UR QL AUTO: ABNORMAL /HPF
BASOPHILS # BLD AUTO: 0.03 10*3/MM3 (ref 0–0.2)
BASOPHILS NFR BLD AUTO: 0.3 % (ref 0–1.5)
BILIRUB SERPL-MCNC: 0.2 MG/DL (ref 0–1.2)
BILIRUB UR QL STRIP: NEGATIVE
BUN SERPL-MCNC: 8 MG/DL (ref 6–20)
BUN/CREAT SERPL: 8.7 (ref 7–25)
C TRACH RRNA CVX QL NAA+PROBE: NOT DETECTED
CALCIUM SPEC-SCNC: 9.3 MG/DL (ref 8.6–10.5)
CANDIDA SPECIES: NEGATIVE
CHLORIDE SERPL-SCNC: 100 MMOL/L (ref 98–107)
CLARITY UR: CLEAR
CO2 SERPL-SCNC: 26.5 MMOL/L (ref 22–29)
COLOR UR: YELLOW
CREAT SERPL-MCNC: 0.92 MG/DL (ref 0.57–1)
DEPRECATED RDW RBC AUTO: 41.3 FL (ref 37–54)
EOSINOPHIL # BLD AUTO: 0.07 10*3/MM3 (ref 0–0.4)
EOSINOPHIL NFR BLD AUTO: 0.6 % (ref 0.3–6.2)
ERYTHROCYTE [DISTWIDTH] IN BLOOD BY AUTOMATED COUNT: 12 % (ref 12.3–15.4)
GARDNERELLA VAGINALIS: NEGATIVE
GFR SERPL CREATININE-BSD FRML MDRD: 71 ML/MIN/1.73
GLOBULIN UR ELPH-MCNC: 2.4 GM/DL
GLUCOSE SERPL-MCNC: 89 MG/DL (ref 65–99)
GLUCOSE UR STRIP-MCNC: NEGATIVE MG/DL
HCT VFR BLD AUTO: 43.4 % (ref 34–46.6)
HGB BLD-MCNC: 14.7 G/DL (ref 12–15.9)
HGB UR QL STRIP.AUTO: ABNORMAL
HOLD SPECIMEN: NORMAL
HOLD SPECIMEN: NORMAL
HYALINE CASTS UR QL AUTO: ABNORMAL /LPF
IMM GRANULOCYTES # BLD AUTO: 0.03 10*3/MM3 (ref 0–0.05)
IMM GRANULOCYTES NFR BLD AUTO: 0.3 % (ref 0–0.5)
KETONES UR QL STRIP: NEGATIVE
LEUKOCYTE ESTERASE UR QL STRIP.AUTO: ABNORMAL
LIPASE SERPL-CCNC: 36 U/L (ref 13–60)
LYMPHOCYTES # BLD AUTO: 1.77 10*3/MM3 (ref 0.7–3.1)
LYMPHOCYTES NFR BLD AUTO: 15 % (ref 19.6–45.3)
MCH RBC QN AUTO: 31.7 PG (ref 26.6–33)
MCHC RBC AUTO-ENTMCNC: 33.9 G/DL (ref 31.5–35.7)
MCV RBC AUTO: 93.5 FL (ref 79–97)
MONOCYTES # BLD AUTO: 0.65 10*3/MM3 (ref 0.1–0.9)
MONOCYTES NFR BLD AUTO: 5.5 % (ref 5–12)
N GONORRHOEA RRNA SPEC QL NAA+PROBE: NOT DETECTED
NEUTROPHILS NFR BLD AUTO: 78.3 % (ref 42.7–76)
NEUTROPHILS NFR BLD AUTO: 9.22 10*3/MM3 (ref 1.7–7)
NITRITE UR QL STRIP: NEGATIVE
NRBC BLD AUTO-RTO: 0 /100 WBC (ref 0–0.2)
PH UR STRIP.AUTO: 7.5 [PH] (ref 5–8)
PLATELET # BLD AUTO: 277 10*3/MM3 (ref 140–450)
PMV BLD AUTO: 9.4 FL (ref 6–12)
POTASSIUM SERPL-SCNC: 4 MMOL/L (ref 3.5–5.2)
PROT SERPL-MCNC: 7 G/DL (ref 6–8.5)
PROT UR QL STRIP: NEGATIVE
RBC # BLD AUTO: 4.64 10*6/MM3 (ref 3.77–5.28)
RBC # UR: ABNORMAL /HPF
REF LAB TEST METHOD: ABNORMAL
SODIUM SERPL-SCNC: 136 MMOL/L (ref 136–145)
SP GR UR STRIP: 1.01 (ref 1–1.03)
SQUAMOUS #/AREA URNS HPF: ABNORMAL /HPF
T VAGINALIS DNA VAG QL PROBE+SIG AMP: NEGATIVE
UROBILINOGEN UR QL STRIP: ABNORMAL
WBC # BLD AUTO: 11.77 10*3/MM3 (ref 3.4–10.8)
WBC UR QL AUTO: ABNORMAL /HPF
WHOLE BLOOD HOLD SPECIMEN: NORMAL
WHOLE BLOOD HOLD SPECIMEN: NORMAL

## 2021-10-12 PROCEDURE — 96375 TX/PRO/DX INJ NEW DRUG ADDON: CPT

## 2021-10-12 PROCEDURE — 0 IOPAMIDOL PER 1 ML: Performed by: EMERGENCY MEDICINE

## 2021-10-12 PROCEDURE — 80053 COMPREHEN METABOLIC PANEL: CPT

## 2021-10-12 PROCEDURE — 99283 EMERGENCY DEPT VISIT LOW MDM: CPT

## 2021-10-12 PROCEDURE — 36415 COLL VENOUS BLD VENIPUNCTURE: CPT

## 2021-10-12 PROCEDURE — 96365 THER/PROPH/DIAG IV INF INIT: CPT

## 2021-10-12 PROCEDURE — 25010000002 ONDANSETRON PER 1 MG: Performed by: NURSE PRACTITIONER

## 2021-10-12 PROCEDURE — 81001 URINALYSIS AUTO W/SCOPE: CPT

## 2021-10-12 PROCEDURE — 87491 CHLMYD TRACH DNA AMP PROBE: CPT | Performed by: NURSE PRACTITIONER

## 2021-10-12 PROCEDURE — 87510 GARDNER VAG DNA DIR PROBE: CPT | Performed by: NURSE PRACTITIONER

## 2021-10-12 PROCEDURE — 87591 N.GONORRHOEAE DNA AMP PROB: CPT | Performed by: NURSE PRACTITIONER

## 2021-10-12 PROCEDURE — 83690 ASSAY OF LIPASE: CPT

## 2021-10-12 PROCEDURE — 87480 CANDIDA DNA DIR PROBE: CPT | Performed by: NURSE PRACTITIONER

## 2021-10-12 PROCEDURE — 87660 TRICHOMONAS VAGIN DIR PROBE: CPT | Performed by: NURSE PRACTITIONER

## 2021-10-12 PROCEDURE — 74177 CT ABD & PELVIS W/CONTRAST: CPT

## 2021-10-12 PROCEDURE — 85025 COMPLETE CBC W/AUTO DIFF WBC: CPT

## 2021-10-12 PROCEDURE — 25010000002 KETOROLAC TROMETHAMINE PER 15 MG: Performed by: NURSE PRACTITIONER

## 2021-10-12 PROCEDURE — 25010000002 CEFTRIAXONE PER 250 MG: Performed by: NURSE PRACTITIONER

## 2021-10-12 RX ORDER — SODIUM CHLORIDE 0.9 % (FLUSH) 0.9 %
10 SYRINGE (ML) INJECTION AS NEEDED
Status: DISCONTINUED | OUTPATIENT
Start: 2021-10-12 | End: 2021-10-12 | Stop reason: HOSPADM

## 2021-10-12 RX ORDER — CEFTRIAXONE SODIUM 1 G/50ML
1 INJECTION, SOLUTION INTRAVENOUS ONCE
Status: COMPLETED | OUTPATIENT
Start: 2021-10-12 | End: 2021-10-12

## 2021-10-12 RX ORDER — PHENAZOPYRIDINE HYDROCHLORIDE 200 MG/1
200 TABLET, FILM COATED ORAL 3 TIMES DAILY PRN
Qty: 9 TABLET | Refills: 0 | Status: SHIPPED | OUTPATIENT
Start: 2021-10-12

## 2021-10-12 RX ORDER — KETOROLAC TROMETHAMINE 30 MG/ML
30 INJECTION, SOLUTION INTRAMUSCULAR; INTRAVENOUS ONCE
Status: COMPLETED | OUTPATIENT
Start: 2021-10-12 | End: 2021-10-12

## 2021-10-12 RX ORDER — ONDANSETRON 2 MG/ML
4 INJECTION INTRAMUSCULAR; INTRAVENOUS ONCE
Status: COMPLETED | OUTPATIENT
Start: 2021-10-12 | End: 2021-10-12

## 2021-10-12 RX ADMIN — CEFTRIAXONE SODIUM 1 G: 1 INJECTION, SOLUTION INTRAVENOUS at 11:16

## 2021-10-12 RX ADMIN — KETOROLAC TROMETHAMINE 30 MG: 30 INJECTION, SOLUTION INTRAMUSCULAR; INTRAVENOUS at 10:13

## 2021-10-12 RX ADMIN — SODIUM CHLORIDE 1000 ML: 9 INJECTION, SOLUTION INTRAVENOUS at 10:12

## 2021-10-12 RX ADMIN — IOPAMIDOL 100 ML: 755 INJECTION, SOLUTION INTRAVENOUS at 10:27

## 2021-10-12 RX ADMIN — ONDANSETRON 4 MG: 2 INJECTION INTRAMUSCULAR; INTRAVENOUS at 10:13

## 2021-10-12 NOTE — ED PROVIDER NOTES
Subjective   Seen at  yesterday, DX with UTI and given Bactrim DS RX. Having pain in right upper abdomen radiating into suprapubic area; was told at  she may have a kidney stone and advised to come to ED for evaluation.      History provided by:  Patient   used: No        Review of Systems   Constitutional: Negative.    HENT: Negative.    Eyes: Negative.    Respiratory: Negative.    Cardiovascular: Negative.    Gastrointestinal: Positive for abdominal pain.   Endocrine: Negative.    Genitourinary: Positive for dysuria.   Musculoskeletal: Negative.    Skin: Negative.    Allergic/Immunologic: Negative.    Neurological: Negative.    Hematological: Negative.    Psychiatric/Behavioral: Negative.        Past Medical History:   Diagnosis Date   • Anxiety    • Hyperlipidemia        No Known Allergies    Past Surgical History:   Procedure Laterality Date   •  SECTION     • CHOLECYSTECTOMY     • HYSTERECTOMY         History reviewed. No pertinent family history.    Social History     Socioeconomic History   • Marital status:    Tobacco Use   • Smoking status: Never Smoker   • Smokeless tobacco: Never Used   Substance and Sexual Activity   • Alcohol use: Yes     Comment: occ   • Drug use: Never           Objective   Physical Exam  Constitutional:       Appearance: Normal appearance.   HENT:      Head: Normocephalic and atraumatic.      Nose: Nose normal.      Mouth/Throat:      Mouth: Mucous membranes are moist.   Eyes:      Pupils: Pupils are equal, round, and reactive to light.   Cardiovascular:      Rate and Rhythm: Normal rate and regular rhythm.      Pulses: Normal pulses.   Pulmonary:      Effort: Pulmonary effort is normal.      Breath sounds: Normal breath sounds.   Abdominal:      General: Abdomen is flat. Bowel sounds are normal.      Palpations: Abdomen is soft.      Tenderness: There is abdominal tenderness in the right upper quadrant, right lower quadrant and suprapubic area.  There is no right CVA tenderness.   Musculoskeletal:         General: Normal range of motion.      Cervical back: Normal range of motion.   Skin:     General: Skin is warm and dry.      Capillary Refill: Capillary refill takes less than 2 seconds.   Neurological:      General: No focal deficit present.      Mental Status: She is alert and oriented to person, place, and time. Mental status is at baseline.   Psychiatric:         Mood and Affect: Mood normal.         Procedures           ED Course                                           MDM  Number of Diagnoses or Management Options  Acute lower UTI: established and worsening  Generalized abdominal pain: new and requires workup  Diagnosis management comments: The patient is resting comfortably and feels better, is alert and in no distress. Repeat examination is unremarkable and benign; in particular, there's no discomfort at McBurney's point and there is no pulsatile mass. The history, exam, diagnostic testing, and current condition does not suggest acute appendicitis, bowel obstruction, acute cholecystitis, bowel perforation, major gastrointestinal bleeding, severe diverticulitis, abdominal aortic aneurysm, mesenteric ischemia, volvulus, sepsis, or other significant pathology that warrants further testing, continued ED treatment, admission, for surgical evaluation at this point. The vital signs have been stable. The patient does not have uncontrollable pain, intractable vomiting, or other significant symptoms. The patient's condition is stable and appropriate for discharge from the emergency department. The CT showed diffuse bladder thickening. I discussed this with the patient and she is to follow up with Dr Leonardo (urology) regarding this finding as recommended.       Amount and/or Complexity of Data Reviewed  Clinical lab tests: reviewed and ordered  Tests in the radiology section of CPT®: reviewed and ordered    Risk of Complications, Morbidity, and/or  Mortality  Presenting problems: low  Diagnostic procedures: low  Management options: low    Patient Progress  Patient progress: stable      Final diagnoses:   Acute lower UTI   Generalized abdominal pain       ED Disposition  ED Disposition     ED Disposition Condition Comment    Discharge Stable           Venkata Leonardo MD  [address]  [place] [address]  [phone]    Schedule an appointment as soon as possible for a visit   for follow up of CT result as we discussed.         Medication List      New Prescriptions    phenazopyridine 200 MG tablet  Commonly known as: PYRIDIUM  Take 1 tablet by mouth 3 (Three) Times a Day As Needed (urinary pain).           Where to Get Your Medications      These medications were sent to [facility]/pharmacy #[id number] - [place], [place] - [address] - [phone]  - [phone] FX  [address] [place] [address]    Hours: 24-hours Phone: [phone]   · phenazopyridine 200 MG tablet          Elisha Johnson, SHE  10/12/21 1242

## 2021-10-12 NOTE — ED NOTES
Pt states began having abd pain with lower pelvic pain on Friday of last week, was seen at urgent care informed may have kidney stone. C/o burning with urination as well as scant amount of vaginal bleeding when wiping. C/o pain to ruq that radiates around to lower pelvic region. abd soft with tenderness.      Ruby Phoenix, RN  10/12/21 5923

## 2021-10-13 ENCOUNTER — TELEPHONE (OUTPATIENT)
Dept: URGENT CARE | Facility: CLINIC | Age: 31
End: 2021-10-13

## 2021-10-14 ENCOUNTER — TELEPHONE (OUTPATIENT)
Dept: URGENT CARE | Facility: CLINIC | Age: 31
End: 2021-10-14

## 2021-10-14 DIAGNOSIS — N39.0 URINARY TRACT INFECTION IN FEMALE: Primary | ICD-10-CM

## 2021-10-14 RX ORDER — NITROFURANTOIN 25; 75 MG/1; MG/1
100 CAPSULE ORAL 2 TIMES DAILY
Qty: 14 CAPSULE | Refills: 0 | Status: SHIPPED | OUTPATIENT
Start: 2021-10-14 | End: 2021-10-21

## 2021-10-14 NOTE — TELEPHONE ENCOUNTER
Patient notified of positive urine culture results. Stop Bactrim. Rx sent to [facility] for macrobid.

## 2022-05-30 ENCOUNTER — HOSPITAL ENCOUNTER (EMERGENCY)
Facility: HOSPITAL | Age: 32
Discharge: HOME | End: 2022-05-30
Attending: EMERGENCY MEDICINE | Admitting: EMERGENCY MEDICINE

## 2022-05-30 ENCOUNTER — APPOINTMENT (OUTPATIENT)
Dept: CT IMAGING | Facility: HOSPITAL | Age: 32
End: 2022-05-30

## 2022-05-30 VITALS
HEIGHT: 63 IN | BODY MASS INDEX: 26.68 KG/M2 | SYSTOLIC BLOOD PRESSURE: 109 MMHG | RESPIRATION RATE: 17 BRPM | WEIGHT: 150.57 LBS | HEART RATE: 70 BPM | TEMPERATURE: 98.4 F | DIASTOLIC BLOOD PRESSURE: 81 MMHG | OXYGEN SATURATION: 97 %

## 2022-05-30 DIAGNOSIS — N39.0 ACUTE UTI: Primary | ICD-10-CM

## 2022-05-30 LAB
ALBUMIN SERPL-MCNC: 4.7 G/DL (ref 3.5–5.2)
ALBUMIN/GLOB SERPL: 2 G/DL
ALP SERPL-CCNC: 69 U/L (ref 39–117)
ALT SERPL W P-5'-P-CCNC: 15 U/L (ref 1–33)
ANION GAP SERPL CALCULATED.3IONS-SCNC: 13 MMOL/L (ref 5–15)
AST SERPL-CCNC: 17 U/L (ref 1–32)
BACTERIA UR QL AUTO: ABNORMAL /HPF
BASOPHILS # BLD AUTO: 0.03 10*3/MM3 (ref 0–0.2)
BASOPHILS NFR BLD AUTO: 0.3 % (ref 0–1.5)
BILIRUB SERPL-MCNC: 0.3 MG/DL (ref 0–1.2)
BILIRUB UR QL STRIP: NEGATIVE
BUN SERPL-MCNC: 14 MG/DL (ref 6–20)
BUN/CREAT SERPL: 17.3 (ref 7–25)
CALCIUM SPEC-SCNC: 9.4 MG/DL (ref 8.6–10.5)
CHLORIDE SERPL-SCNC: 102 MMOL/L (ref 98–107)
CLARITY UR: ABNORMAL
CO2 SERPL-SCNC: 25 MMOL/L (ref 22–29)
COLOR UR: YELLOW
CREAT SERPL-MCNC: 0.81 MG/DL (ref 0.57–1)
DEPRECATED RDW RBC AUTO: 39.8 FL (ref 37–54)
EGFRCR SERPLBLD CKD-EPI 2021: 99.1 ML/MIN/1.73
EOSINOPHIL # BLD AUTO: 0.08 10*3/MM3 (ref 0–0.4)
EOSINOPHIL NFR BLD AUTO: 0.7 % (ref 0.3–6.2)
ERYTHROCYTE [DISTWIDTH] IN BLOOD BY AUTOMATED COUNT: 11.9 % (ref 12.3–15.4)
GLOBULIN UR ELPH-MCNC: 2.4 GM/DL
GLUCOSE SERPL-MCNC: 116 MG/DL (ref 65–99)
GLUCOSE UR STRIP-MCNC: NEGATIVE MG/DL
HCT VFR BLD AUTO: 44 % (ref 34–46.6)
HGB BLD-MCNC: 14.6 G/DL (ref 12–15.9)
HGB UR QL STRIP.AUTO: ABNORMAL
HOLD SPECIMEN: NORMAL
HOLD SPECIMEN: NORMAL
HYALINE CASTS UR QL AUTO: ABNORMAL /LPF
IMM GRANULOCYTES # BLD AUTO: 0.02 10*3/MM3 (ref 0–0.05)
IMM GRANULOCYTES NFR BLD AUTO: 0.2 % (ref 0–0.5)
KETONES UR QL STRIP: NEGATIVE
LEUKOCYTE ESTERASE UR QL STRIP.AUTO: ABNORMAL
LIPASE SERPL-CCNC: 34 U/L (ref 13–60)
LYMPHOCYTES # BLD AUTO: 1.3 10*3/MM3 (ref 0.7–3.1)
LYMPHOCYTES NFR BLD AUTO: 10.9 % (ref 19.6–45.3)
MCH RBC QN AUTO: 30.6 PG (ref 26.6–33)
MCHC RBC AUTO-ENTMCNC: 33.2 G/DL (ref 31.5–35.7)
MCV RBC AUTO: 92.2 FL (ref 79–97)
MONOCYTES # BLD AUTO: 0.63 10*3/MM3 (ref 0.1–0.9)
MONOCYTES NFR BLD AUTO: 5.3 % (ref 5–12)
NEUTROPHILS NFR BLD AUTO: 82.6 % (ref 42.7–76)
NEUTROPHILS NFR BLD AUTO: 9.9 10*3/MM3 (ref 1.7–7)
NITRITE UR QL STRIP: NEGATIVE
NRBC BLD AUTO-RTO: 0 /100 WBC (ref 0–0.2)
PH UR STRIP.AUTO: 5.5 [PH] (ref 5–8)
PLATELET # BLD AUTO: 294 10*3/MM3 (ref 140–450)
PMV BLD AUTO: 9.4 FL (ref 6–12)
POTASSIUM SERPL-SCNC: 3.4 MMOL/L (ref 3.5–5.2)
PROT SERPL-MCNC: 7.1 G/DL (ref 6–8.5)
PROT UR QL STRIP: ABNORMAL
RBC # BLD AUTO: 4.77 10*6/MM3 (ref 3.77–5.28)
RBC # UR STRIP: ABNORMAL /HPF
REF LAB TEST METHOD: ABNORMAL
SODIUM SERPL-SCNC: 140 MMOL/L (ref 136–145)
SP GR UR STRIP: 1.01 (ref 1–1.03)
SQUAMOUS #/AREA URNS HPF: ABNORMAL /HPF
UROBILINOGEN UR QL STRIP: ABNORMAL
WBC # UR STRIP: ABNORMAL /HPF
WBC NRBC COR # BLD: 11.96 10*3/MM3 (ref 3.4–10.8)
WHOLE BLOOD HOLD COAG: NORMAL
WHOLE BLOOD HOLD SPECIMEN: NORMAL

## 2022-05-30 PROCEDURE — 96365 THER/PROPH/DIAG IV INF INIT: CPT

## 2022-05-30 PROCEDURE — 25010000002 HYDROMORPHONE 1 MG/ML SOLUTION: Performed by: EMERGENCY MEDICINE

## 2022-05-30 PROCEDURE — 0 IOPAMIDOL PER 1 ML: Performed by: EMERGENCY MEDICINE

## 2022-05-30 PROCEDURE — 99283 EMERGENCY DEPT VISIT LOW MDM: CPT

## 2022-05-30 PROCEDURE — 36415 COLL VENOUS BLD VENIPUNCTURE: CPT

## 2022-05-30 PROCEDURE — 81001 URINALYSIS AUTO W/SCOPE: CPT | Performed by: EMERGENCY MEDICINE

## 2022-05-30 PROCEDURE — 25010000002 ONDANSETRON PER 1 MG: Performed by: EMERGENCY MEDICINE

## 2022-05-30 PROCEDURE — 83690 ASSAY OF LIPASE: CPT

## 2022-05-30 PROCEDURE — 96375 TX/PRO/DX INJ NEW DRUG ADDON: CPT

## 2022-05-30 PROCEDURE — 74177 CT ABD & PELVIS W/CONTRAST: CPT

## 2022-05-30 PROCEDURE — 80053 COMPREHEN METABOLIC PANEL: CPT

## 2022-05-30 PROCEDURE — 25010000002 CEFTRIAXONE PER 250 MG: Performed by: EMERGENCY MEDICINE

## 2022-05-30 PROCEDURE — 85025 COMPLETE CBC W/AUTO DIFF WBC: CPT

## 2022-05-30 RX ORDER — ONDANSETRON 4 MG/1
4 TABLET, ORALLY DISINTEGRATING ORAL EVERY 8 HOURS PRN
Qty: 15 TABLET | Refills: 0 | Status: SHIPPED | OUTPATIENT
Start: 2022-05-30

## 2022-05-30 RX ORDER — POTASSIUM CHLORIDE 750 MG/1
20 CAPSULE, EXTENDED RELEASE ORAL ONCE
Status: DISCONTINUED | OUTPATIENT
Start: 2022-05-30 | End: 2022-05-30 | Stop reason: HOSPADM

## 2022-05-30 RX ORDER — SODIUM CHLORIDE 0.9 % (FLUSH) 0.9 %
10 SYRINGE (ML) INJECTION AS NEEDED
Status: DISCONTINUED | OUTPATIENT
Start: 2022-05-30 | End: 2022-05-30 | Stop reason: HOSPADM

## 2022-05-30 RX ORDER — ONDANSETRON 2 MG/ML
4 INJECTION INTRAMUSCULAR; INTRAVENOUS ONCE
Status: COMPLETED | OUTPATIENT
Start: 2022-05-30 | End: 2022-05-30

## 2022-05-30 RX ORDER — CEPHALEXIN 500 MG/1
500 CAPSULE ORAL 4 TIMES DAILY
Qty: 40 CAPSULE | Refills: 0 | Status: SHIPPED | OUTPATIENT
Start: 2022-05-30

## 2022-05-30 RX ORDER — CEFTRIAXONE SODIUM 1 G/50ML
1 INJECTION, SOLUTION INTRAVENOUS ONCE
Status: COMPLETED | OUTPATIENT
Start: 2022-05-30 | End: 2022-05-30

## 2022-05-30 RX ORDER — HYDROCODONE BITARTRATE AND ACETAMINOPHEN 5; 325 MG/1; MG/1
1 TABLET ORAL EVERY 6 HOURS PRN
Qty: 12 TABLET | Refills: 0 | Status: SHIPPED | OUTPATIENT
Start: 2022-05-30

## 2022-05-30 RX ADMIN — HYDROMORPHONE HYDROCHLORIDE 1 MG: 1 INJECTION, SOLUTION INTRAMUSCULAR; INTRAVENOUS; SUBCUTANEOUS at 11:11

## 2022-05-30 RX ADMIN — ONDANSETRON 4 MG: 2 INJECTION INTRAMUSCULAR; INTRAVENOUS at 11:12

## 2022-05-30 RX ADMIN — CEFTRIAXONE SODIUM 1 G: 1 INJECTION, SOLUTION INTRAVENOUS at 12:50

## 2022-05-30 RX ADMIN — IOPAMIDOL 100 ML: 755 INJECTION, SOLUTION INTRAVENOUS at 11:34

## 2023-11-03 ENCOUNTER — TRANSCRIBE ORDERS (OUTPATIENT)
Dept: ADMINISTRATIVE | Facility: HOSPITAL | Age: 33
End: 2023-11-03
Payer: COMMERCIAL

## 2023-11-03 DIAGNOSIS — E03.9 PRIMARY HYPOTHYROIDISM: Primary | ICD-10-CM

## 2023-11-03 DIAGNOSIS — N64.4 BREAST PAIN: Primary | ICD-10-CM

## 2024-01-19 ENCOUNTER — HOSPITAL ENCOUNTER (OUTPATIENT)
Dept: MAMMOGRAPHY | Facility: HOSPITAL | Age: 34
Discharge: HOME | End: 2024-01-19
Payer: COMMERCIAL

## 2024-01-19 ENCOUNTER — HOSPITAL ENCOUNTER (OUTPATIENT)
Dept: ULTRASOUND IMAGING | Facility: HOSPITAL | Age: 34
Discharge: HOME | End: 2024-01-19
Payer: COMMERCIAL

## 2024-01-19 DIAGNOSIS — N64.4 BREAST PAIN: ICD-10-CM

## 2024-01-19 DIAGNOSIS — E03.9 PRIMARY HYPOTHYROIDISM: ICD-10-CM

## 2024-01-19 PROCEDURE — 76536 US EXAM OF HEAD AND NECK: CPT

## 2024-01-19 PROCEDURE — 77066 DX MAMMO INCL CAD BI: CPT

## 2024-01-19 PROCEDURE — G0279 TOMOSYNTHESIS, MAMMO: HCPCS

## 2024-01-19 PROCEDURE — 76642 ULTRASOUND BREAST LIMITED: CPT
